# Patient Record
Sex: FEMALE | Race: WHITE | NOT HISPANIC OR LATINO | Employment: OTHER | URBAN - METROPOLITAN AREA
[De-identification: names, ages, dates, MRNs, and addresses within clinical notes are randomized per-mention and may not be internally consistent; named-entity substitution may affect disease eponyms.]

---

## 2018-01-01 ENCOUNTER — HOSPITAL ENCOUNTER (INPATIENT)
Facility: HOSPITAL | Age: 78
LOS: 4 days | Discharge: HOSPICE | End: 2018-03-19
Attending: INTERNAL MEDICINE | Admitting: INTERNAL MEDICINE

## 2018-01-01 ENCOUNTER — HOSPITAL ENCOUNTER (INPATIENT)
Facility: HOSPITAL | Age: 78
LOS: 1 days | End: 2018-03-20
Attending: INTERNAL MEDICINE | Admitting: INTERNAL MEDICINE

## 2018-01-01 ENCOUNTER — APPOINTMENT (OUTPATIENT)
Dept: CT IMAGING | Facility: HOSPITAL | Age: 78
End: 2018-01-01

## 2018-01-01 VITALS — OXYGEN SATURATION: 79 % | SYSTOLIC BLOOD PRESSURE: 112 MMHG | TEMPERATURE: 98.4 F | DIASTOLIC BLOOD PRESSURE: 69 MMHG

## 2018-01-01 VITALS
HEART RATE: 157 BPM | OXYGEN SATURATION: 66 % | SYSTOLIC BLOOD PRESSURE: 128 MMHG | DIASTOLIC BLOOD PRESSURE: 81 MMHG | WEIGHT: 128.53 LBS | BODY MASS INDEX: 20.66 KG/M2 | RESPIRATION RATE: 14 BRPM | HEIGHT: 66 IN | TEMPERATURE: 103.4 F

## 2018-01-01 LAB
ABO GROUP BLD: NORMAL
ANION GAP SERPL CALCULATED.3IONS-SCNC: 17.9 MMOL/L
ARTERIAL PATENCY WRIST A: POSITIVE
ATMOSPHERIC PRESS: 745 MMHG
BASE EXCESS BLDA CALC-SCNC: 0.4 MMOL/L (ref 0–2)
BDY SITE: ABNORMAL
BLD GP AB SCN SERPL QL: NEGATIVE
BUN BLD-MCNC: 15 MG/DL (ref 8–23)
BUN/CREAT SERPL: 15 (ref 7–25)
CALCIUM SPEC-SCNC: 10.1 MG/DL (ref 8.6–10.5)
CHLORIDE SERPL-SCNC: 101 MMOL/L (ref 98–107)
CO2 SERPL-SCNC: 23.1 MMOL/L (ref 22–29)
CREAT BLD-MCNC: 1 MG/DL (ref 0.57–1)
DEPRECATED RDW RBC AUTO: 45.3 FL (ref 37–54)
ERYTHROCYTE [DISTWIDTH] IN BLOOD BY AUTOMATED COUNT: 14.1 % (ref 11.7–13)
GFR SERPL CREATININE-BSD FRML MDRD: 54 ML/MIN/1.73
GLUCOSE BLD-MCNC: 152 MG/DL (ref 65–99)
GLUCOSE BLDC GLUCOMTR-MCNC: 142 MG/DL (ref 70–130)
HCO3 BLDA-SCNC: 24.6 MMOL/L (ref 22–28)
HCT VFR BLD AUTO: 49.2 % (ref 35.6–45.5)
HGB BLD-MCNC: 16.6 G/DL (ref 11.9–15.5)
HOROWITZ INDEX BLD+IHG-RTO: 100 %
INR PPP: 2.95 (ref 0.9–1.1)
MCH RBC QN AUTO: 30.4 PG (ref 26.9–32)
MCHC RBC AUTO-ENTMCNC: 33.7 G/DL (ref 32.4–36.3)
MCV RBC AUTO: 90.1 FL (ref 80.5–98.2)
MODALITY: ABNORMAL
O2 A-A PPRESDIFF RESPIRATORY: 0.9 MMHG
PCO2 BLDA: 37.7 MM HG (ref 35–45)
PEEP RESPIRATORY: 5 CM[H2O]
PH BLDA: 7.42 PH UNITS (ref 7.35–7.45)
PLATELET # BLD AUTO: 177 10*3/MM3 (ref 140–500)
PMV BLD AUTO: 10.4 FL (ref 6–12)
PO2 BLDA: 595.7 MM HG (ref 80–100)
POTASSIUM BLD-SCNC: 3.5 MMOL/L (ref 3.5–5.2)
PROTHROMBIN TIME: 30.2 SECONDS (ref 11.7–14.2)
RBC # BLD AUTO: 5.46 10*6/MM3 (ref 3.9–5.2)
RH BLD: POSITIVE
SAO2 % BLDCOA: 100 % (ref 92–99)
SET MECH RESP RATE: 20
SODIUM BLD-SCNC: 142 MMOL/L (ref 136–145)
TOTAL RATE: 20 BREATHS/MINUTE
TROPONIN T SERPL-MCNC: <0.01 NG/ML (ref 0–0.03)
VENTILATOR MODE: ABNORMAL
VT ON VENT VENT: 400 ML
WBC NRBC COR # BLD: 9.99 10*3/MM3 (ref 4.5–10.7)

## 2018-01-01 PROCEDURE — 84484 ASSAY OF TROPONIN QUANT: CPT | Performed by: INTERNAL MEDICINE

## 2018-01-01 PROCEDURE — 85610 PROTHROMBIN TIME: CPT | Performed by: PSYCHIATRY & NEUROLOGY

## 2018-01-01 PROCEDURE — 25010000002 MORPHINE PER 10 MG: Performed by: INTERNAL MEDICINE

## 2018-01-01 PROCEDURE — 25010000002 MORPHINE SULFATE (PF) 2 MG/ML SOLUTION: Performed by: INTERNAL MEDICINE

## 2018-01-01 PROCEDURE — 94799 UNLISTED PULMONARY SVC/PX: CPT

## 2018-01-01 PROCEDURE — 25010000002 PROTHROMBIN COMPLEX CONC HUMAN 1000 UNITS KIT: Performed by: PSYCHIATRY & NEUROLOGY

## 2018-01-01 PROCEDURE — 25010000002 LORAZEPAM PER 2 MG: Performed by: INTERNAL MEDICINE

## 2018-01-01 PROCEDURE — 25010000002 VITAMIN K1 PER 1 MG: Performed by: PSYCHIATRY & NEUROLOGY

## 2018-01-01 PROCEDURE — 82803 BLOOD GASES ANY COMBINATION: CPT

## 2018-01-01 PROCEDURE — 5A1945Z RESPIRATORY VENTILATION, 24-96 CONSECUTIVE HOURS: ICD-10-PCS | Performed by: INTERNAL MEDICINE

## 2018-01-01 PROCEDURE — 85027 COMPLETE CBC AUTOMATED: CPT | Performed by: INTERNAL MEDICINE

## 2018-01-01 PROCEDURE — 80048 BASIC METABOLIC PNL TOTAL CA: CPT | Performed by: INTERNAL MEDICINE

## 2018-01-01 PROCEDURE — 86900 BLOOD TYPING SEROLOGIC ABO: CPT | Performed by: INTERNAL MEDICINE

## 2018-01-01 PROCEDURE — 82962 GLUCOSE BLOOD TEST: CPT

## 2018-01-01 PROCEDURE — C9132 KCENTRA, PER I.U.: HCPCS | Performed by: PSYCHIATRY & NEUROLOGY

## 2018-01-01 PROCEDURE — 70450 CT HEAD/BRAIN W/O DYE: CPT

## 2018-01-01 PROCEDURE — 94002 VENT MGMT INPAT INIT DAY: CPT

## 2018-01-01 PROCEDURE — 86901 BLOOD TYPING SEROLOGIC RH(D): CPT | Performed by: INTERNAL MEDICINE

## 2018-01-01 PROCEDURE — 36600 WITHDRAWAL OF ARTERIAL BLOOD: CPT

## 2018-01-01 PROCEDURE — 86850 RBC ANTIBODY SCREEN: CPT | Performed by: INTERNAL MEDICINE

## 2018-01-01 PROCEDURE — 99222 1ST HOSP IP/OBS MODERATE 55: CPT | Performed by: PSYCHIATRY & NEUROLOGY

## 2018-01-01 RX ORDER — LORAZEPAM 2 MG/ML
1 CONCENTRATE ORAL
Status: DISCONTINUED | OUTPATIENT
Start: 2018-01-01 | End: 2018-01-01 | Stop reason: HOSPADM

## 2018-01-01 RX ORDER — GLYCOPYRROLATE 0.2 MG/ML
0.4 INJECTION INTRAMUSCULAR; INTRAVENOUS
Status: CANCELLED | OUTPATIENT
Start: 2018-01-01

## 2018-01-01 RX ORDER — GLYCOPYRROLATE 0.2 MG/ML
0.2 INJECTION INTRAMUSCULAR; INTRAVENOUS
Status: CANCELLED | OUTPATIENT
Start: 2018-01-01

## 2018-01-01 RX ORDER — LORAZEPAM 2 MG/ML
2 CONCENTRATE ORAL
Status: DISCONTINUED | OUTPATIENT
Start: 2018-01-01 | End: 2018-01-01 | Stop reason: HOSPADM

## 2018-01-01 RX ORDER — MORPHINE SULFATE 10 MG/ML
6 INJECTION INTRAMUSCULAR; INTRAVENOUS; SUBCUTANEOUS
Status: DISCONTINUED | OUTPATIENT
Start: 2018-01-01 | End: 2018-01-01 | Stop reason: HOSPADM

## 2018-01-01 RX ORDER — LORAZEPAM 2 MG/ML
2 INJECTION INTRAMUSCULAR
Status: DISCONTINUED | OUTPATIENT
Start: 2018-01-01 | End: 2018-01-01 | Stop reason: HOSPADM

## 2018-01-01 RX ORDER — GLYCOPYRROLATE 0.2 MG/ML
0.4 INJECTION INTRAMUSCULAR; INTRAVENOUS
Status: DISCONTINUED | OUTPATIENT
Start: 2018-01-01 | End: 2018-01-01 | Stop reason: HOSPADM

## 2018-01-01 RX ORDER — GLYCOPYRROLATE 0.2 MG/ML
0.2 INJECTION INTRAMUSCULAR; INTRAVENOUS
Status: DISCONTINUED | OUTPATIENT
Start: 2018-01-01 | End: 2018-01-01 | Stop reason: HOSPADM

## 2018-01-01 RX ORDER — SODIUM CHLORIDE 0.9 % (FLUSH) 0.9 %
1-10 SYRINGE (ML) INJECTION AS NEEDED
Status: DISCONTINUED | OUTPATIENT
Start: 2018-01-01 | End: 2018-01-01 | Stop reason: HOSPADM

## 2018-01-01 RX ORDER — MORPHINE SULFATE 20 MG/ML
20 SOLUTION ORAL
Status: CANCELLED | OUTPATIENT
Start: 2018-01-01 | End: 2018-03-25

## 2018-01-01 RX ORDER — LORAZEPAM 2 MG/ML
1 INJECTION INTRAMUSCULAR
Status: CANCELLED | OUTPATIENT
Start: 2018-01-01 | End: 2018-03-25

## 2018-01-01 RX ORDER — MORPHINE SULFATE 10 MG/ML
6 INJECTION INTRAMUSCULAR; INTRAVENOUS; SUBCUTANEOUS
Status: CANCELLED | OUTPATIENT
Start: 2018-01-01 | End: 2018-03-25

## 2018-01-01 RX ORDER — ACETAMINOPHEN 325 MG/1
650 TABLET ORAL EVERY 4 HOURS PRN
Status: DISCONTINUED | OUTPATIENT
Start: 2018-01-01 | End: 2018-01-01 | Stop reason: HOSPADM

## 2018-01-01 RX ORDER — LORAZEPAM 2 MG/ML
2 CONCENTRATE ORAL
Status: CANCELLED | OUTPATIENT
Start: 2018-01-01 | End: 2018-03-25

## 2018-01-01 RX ORDER — DIPHENOXYLATE HYDROCHLORIDE AND ATROPINE SULFATE 2.5; .025 MG/1; MG/1
1 TABLET ORAL
Status: DISCONTINUED | OUTPATIENT
Start: 2018-01-01 | End: 2018-01-01 | Stop reason: HOSPADM

## 2018-01-01 RX ORDER — LORAZEPAM 2 MG/ML
0.5 INJECTION INTRAMUSCULAR
Status: DISCONTINUED | OUTPATIENT
Start: 2018-01-01 | End: 2018-01-01 | Stop reason: HOSPADM

## 2018-01-01 RX ORDER — DIPHENOXYLATE HYDROCHLORIDE AND ATROPINE SULFATE 2.5; .025 MG/1; MG/1
1 TABLET ORAL
Status: CANCELLED | OUTPATIENT
Start: 2018-01-01

## 2018-01-01 RX ORDER — LORAZEPAM 2 MG/ML
0.5 INJECTION INTRAMUSCULAR
Status: CANCELLED | OUTPATIENT
Start: 2018-01-01 | End: 2018-03-25

## 2018-01-01 RX ORDER — ACETAMINOPHEN 160 MG/5ML
650 SOLUTION ORAL EVERY 4 HOURS PRN
Status: CANCELLED | OUTPATIENT
Start: 2018-01-01

## 2018-01-01 RX ORDER — MORPHINE SULFATE 20 MG/ML
20 SOLUTION ORAL
Status: DISCONTINUED | OUTPATIENT
Start: 2018-01-01 | End: 2018-01-01 | Stop reason: HOSPADM

## 2018-01-01 RX ORDER — LORAZEPAM 2 MG/ML
0.5 CONCENTRATE ORAL
Status: DISCONTINUED | OUTPATIENT
Start: 2018-01-01 | End: 2018-01-01 | Stop reason: HOSPADM

## 2018-01-01 RX ORDER — ACETAMINOPHEN 160 MG/5ML
650 SOLUTION ORAL EVERY 4 HOURS PRN
Status: DISCONTINUED | OUTPATIENT
Start: 2018-01-01 | End: 2018-01-01 | Stop reason: HOSPADM

## 2018-01-01 RX ORDER — SCOLOPAMINE TRANSDERMAL SYSTEM 1 MG/1
1 PATCH, EXTENDED RELEASE TRANSDERMAL
Status: DISCONTINUED | OUTPATIENT
Start: 2018-01-01 | End: 2018-01-01 | Stop reason: HOSPADM

## 2018-01-01 RX ORDER — SCOLOPAMINE TRANSDERMAL SYSTEM 1 MG/1
1 PATCH, EXTENDED RELEASE TRANSDERMAL
Status: CANCELLED | OUTPATIENT
Start: 2018-01-01

## 2018-01-01 RX ORDER — ACETAMINOPHEN 650 MG/1
650 SUPPOSITORY RECTAL EVERY 4 HOURS PRN
Status: DISCONTINUED | OUTPATIENT
Start: 2018-01-01 | End: 2018-01-01 | Stop reason: HOSPADM

## 2018-01-01 RX ORDER — LORAZEPAM 2 MG/ML
1 INJECTION INTRAMUSCULAR
Status: DISCONTINUED | OUTPATIENT
Start: 2018-01-01 | End: 2018-01-01 | Stop reason: HOSPADM

## 2018-01-01 RX ORDER — LORAZEPAM 2 MG/ML
1 CONCENTRATE ORAL
Status: CANCELLED | OUTPATIENT
Start: 2018-01-01 | End: 2018-03-25

## 2018-01-01 RX ORDER — ACETAMINOPHEN 325 MG/1
650 TABLET ORAL EVERY 4 HOURS PRN
Status: CANCELLED | OUTPATIENT
Start: 2018-01-01

## 2018-01-01 RX ORDER — LORAZEPAM 2 MG/ML
2 INJECTION INTRAMUSCULAR
Status: CANCELLED | OUTPATIENT
Start: 2018-01-01 | End: 2018-03-25

## 2018-01-01 RX ORDER — SODIUM CHLORIDE 0.9 % (FLUSH) 0.9 %
1-10 SYRINGE (ML) INJECTION AS NEEDED
Status: CANCELLED | OUTPATIENT
Start: 2018-01-01

## 2018-01-01 RX ORDER — ACETAMINOPHEN 650 MG/1
650 SUPPOSITORY RECTAL EVERY 4 HOURS PRN
Status: CANCELLED | OUTPATIENT
Start: 2018-01-01

## 2018-01-01 RX ORDER — LORAZEPAM 2 MG/ML
0.5 CONCENTRATE ORAL
Status: CANCELLED | OUTPATIENT
Start: 2018-01-01 | End: 2018-03-25

## 2018-01-01 RX ADMIN — LORAZEPAM 1 MG: 2 INJECTION INTRAMUSCULAR; INTRAVENOUS at 04:53

## 2018-01-01 RX ADMIN — MORPHINE SULFATE 6 MG: 10 INJECTION INTRAVENOUS at 13:15

## 2018-01-01 RX ADMIN — LORAZEPAM 1 MG: 2 INJECTION INTRAMUSCULAR; INTRAVENOUS at 17:20

## 2018-01-01 RX ADMIN — MORPHINE SULFATE 6 MG: 10 INJECTION INTRAVENOUS at 12:25

## 2018-01-01 RX ADMIN — LORAZEPAM 1 MG: 2 INJECTION INTRAMUSCULAR; INTRAVENOUS at 17:26

## 2018-01-01 RX ADMIN — MORPHINE SULFATE 6 MG: 10 INJECTION INTRAVENOUS at 15:14

## 2018-01-01 RX ADMIN — LORAZEPAM 1 MG: 2 INJECTION INTRAMUSCULAR; INTRAVENOUS at 16:47

## 2018-01-01 RX ADMIN — LORAZEPAM 1 MG: 2 INJECTION INTRAMUSCULAR; INTRAVENOUS at 12:58

## 2018-01-01 RX ADMIN — LORAZEPAM 1 MG: 2 INJECTION INTRAMUSCULAR; INTRAVENOUS at 04:39

## 2018-01-01 RX ADMIN — LORAZEPAM 1 MG: 2 INJECTION INTRAMUSCULAR; INTRAVENOUS at 20:42

## 2018-01-01 RX ADMIN — LORAZEPAM 1 MG: 2 INJECTION INTRAMUSCULAR; INTRAVENOUS at 08:44

## 2018-01-01 RX ADMIN — LORAZEPAM 1 MG: 2 INJECTION INTRAMUSCULAR; INTRAVENOUS at 13:16

## 2018-01-01 RX ADMIN — MORPHINE SULFATE 6 MG: 10 INJECTION INTRAVENOUS at 13:28

## 2018-01-01 RX ADMIN — MORPHINE SULFATE 6 MG: 10 INJECTION INTRAVENOUS at 08:44

## 2018-01-01 RX ADMIN — LORAZEPAM 1 MG: 2 INJECTION INTRAMUSCULAR; INTRAVENOUS at 00:31

## 2018-01-01 RX ADMIN — NICARDIPINE HYDROCHLORIDE 5 MG/HR: 2.5 INJECTION INTRAVENOUS at 08:25

## 2018-01-01 RX ADMIN — LORAZEPAM 1 MG: 2 INJECTION INTRAMUSCULAR; INTRAVENOUS at 05:48

## 2018-01-01 RX ADMIN — MORPHINE SULFATE 6 MG: 10 INJECTION INTRAVENOUS at 00:31

## 2018-01-01 RX ADMIN — LORAZEPAM 2 MG: 2 INJECTION INTRAMUSCULAR; INTRAVENOUS at 16:44

## 2018-01-01 RX ADMIN — MORPHINE SULFATE 6 MG: 10 INJECTION INTRAVENOUS at 04:33

## 2018-01-01 RX ADMIN — MORPHINE SULFATE 6 MG: 10 INJECTION INTRAVENOUS at 17:25

## 2018-01-01 RX ADMIN — MORPHINE SULFATE 6 MG: 10 INJECTION INTRAVENOUS at 08:39

## 2018-01-01 RX ADMIN — MORPHINE SULFATE 6 MG: 10 INJECTION INTRAVENOUS at 05:49

## 2018-01-01 RX ADMIN — LORAZEPAM 1 MG: 2 INJECTION INTRAMUSCULAR; INTRAVENOUS at 04:33

## 2018-01-01 RX ADMIN — LORAZEPAM 1 MG: 2 INJECTION INTRAMUSCULAR; INTRAVENOUS at 15:14

## 2018-01-01 RX ADMIN — LORAZEPAM 1 MG: 2 INJECTION INTRAMUSCULAR; INTRAVENOUS at 08:43

## 2018-01-01 RX ADMIN — MORPHINE SULFATE 6 MG: 10 INJECTION INTRAVENOUS at 00:30

## 2018-01-01 RX ADMIN — MORPHINE SULFATE 6 MG: 10 INJECTION INTRAVENOUS at 17:20

## 2018-01-01 RX ADMIN — LORAZEPAM 1 MG: 2 INJECTION INTRAMUSCULAR; INTRAVENOUS at 20:45

## 2018-01-01 RX ADMIN — LORAZEPAM 2 MG: 2 INJECTION INTRAMUSCULAR; INTRAVENOUS at 12:25

## 2018-01-01 RX ADMIN — MORPHINE SULFATE 6 MG: 10 INJECTION INTRAVENOUS at 16:48

## 2018-01-01 RX ADMIN — LORAZEPAM 1 MG: 2 INJECTION INTRAMUSCULAR; INTRAVENOUS at 13:28

## 2018-01-01 RX ADMIN — LORAZEPAM 1 MG: 2 INJECTION INTRAMUSCULAR; INTRAVENOUS at 04:32

## 2018-01-01 RX ADMIN — MORPHINE SULFATE 6 MG: 10 INJECTION INTRAVENOUS at 20:44

## 2018-01-01 RX ADMIN — LORAZEPAM 1 MG: 2 INJECTION INTRAMUSCULAR; INTRAVENOUS at 21:03

## 2018-01-01 RX ADMIN — MORPHINE SULFATE 6 MG: 10 INJECTION INTRAVENOUS at 16:44

## 2018-01-01 RX ADMIN — MORPHINE SULFATE 6 MG: 10 INJECTION INTRAVENOUS at 00:23

## 2018-01-01 RX ADMIN — LORAZEPAM 2 MG: 2 INJECTION INTRAMUSCULAR; INTRAVENOUS at 08:54

## 2018-01-01 RX ADMIN — GLYCOPYRROLATE 0.4 MG: 0.2 INJECTION, SOLUTION INTRAMUSCULAR; INTRAVENOUS at 12:58

## 2018-01-01 RX ADMIN — MORPHINE SULFATE 6 MG: 10 INJECTION INTRAVENOUS at 04:53

## 2018-01-01 RX ADMIN — MORPHINE SULFATE 6 MG: 10 INJECTION INTRAVENOUS at 20:48

## 2018-01-01 RX ADMIN — MORPHINE SULFATE 6 MG: 10 INJECTION INTRAVENOUS at 13:39

## 2018-01-01 RX ADMIN — LORAZEPAM 1 MG: 2 INJECTION INTRAMUSCULAR; INTRAVENOUS at 13:39

## 2018-01-01 RX ADMIN — LORAZEPAM 1 MG: 2 INJECTION INTRAMUSCULAR; INTRAVENOUS at 08:40

## 2018-01-01 RX ADMIN — LORAZEPAM 1 MG: 2 INJECTION INTRAMUSCULAR; INTRAVENOUS at 00:30

## 2018-01-01 RX ADMIN — LORAZEPAM 1 MG: 2 INJECTION INTRAMUSCULAR; INTRAVENOUS at 00:23

## 2018-01-01 RX ADMIN — MORPHINE SULFATE 6 MG: 10 INJECTION INTRAVENOUS at 04:39

## 2018-01-01 RX ADMIN — MORPHINE SULFATE 6 MG: 10 INJECTION INTRAVENOUS at 08:53

## 2018-01-01 RX ADMIN — MORPHINE SULFATE 6 MG: 10 INJECTION INTRAVENOUS at 04:32

## 2018-01-01 RX ADMIN — PHYTONADIONE 10 MG: 10 INJECTION, EMULSION INTRAMUSCULAR; INTRAVENOUS; SUBCUTANEOUS at 09:07

## 2018-01-01 RX ADMIN — MORPHINE SULFATE 6 MG: 10 INJECTION INTRAVENOUS at 08:43

## 2018-01-01 RX ADMIN — MORPHINE SULFATE 6 MG: 10 INJECTION INTRAVENOUS at 21:03

## 2018-01-01 RX ADMIN — LORAZEPAM 1 MG: 2 INJECTION INTRAMUSCULAR; INTRAVENOUS at 20:48

## 2018-01-01 RX ADMIN — MORPHINE SULFATE 6 MG: 10 INJECTION INTRAVENOUS at 20:41

## 2018-01-01 RX ADMIN — Medication 5 MG/HR: at 08:25

## 2018-01-01 RX ADMIN — PROTHROMBIN, COAGULATION FACTOR VII HUMAN, COAGULATION FACTOR IX HUMAN, COAGULATION FACTOR X HUMAN, PROTEIN C, PROTEIN S HUMAN, AND WATER 2079 UNITS: KIT at 08:49

## 2018-03-15 PROBLEM — I61.9 INTRACEREBRAL BLEED (HCC): Status: ACTIVE | Noted: 2018-01-01

## 2018-03-15 NOTE — NURSING NOTE
Kika Galvez ([facility]) called back. Pt is ruled out for donation. Okay to extubate when family is ready. Call back with cardiac time of death.

## 2018-03-15 NOTE — NURSING NOTE
Kika Galvez ([facility]) called back stating that pt is ruled out for donation. Staff is okay to extubate whenever family is ready. Call back with cardiac time of death.

## 2018-03-15 NOTE — SIGNIFICANT NOTE
03/15/18 0852   Rehab Time/Intention   Evaluation Not Performed unable to evaluate, medical status change;other (see comments)  (Discussed with RN. Pt currently intubated. ST to s/o at this time. Please re-consult as appropriate. )   Rehab Treatment   Discipline speech language pathologist

## 2018-03-15 NOTE — NURSING NOTE
Spoke with Kkia Galvez with [employer]. Gave background information on patient. She stated that she would review the case and contact staff shortly.

## 2018-03-15 NOTE — PLAN OF CARE
Problem: Patient Care Overview  Goal: Plan of Care Review   03/15/18 1906   Coping/Psychosocial   Plan of Care Reviewed With family   Plan of Care Review   Progress declining   OTHER   Outcome Summary Pt admitted from [facility] with ICH. After speaking with MDs, family opted to persue comfort care. Pt was ruled out by [facility] and extubated around noon. Medicated with ativan and morphine as needed. F/c placed for urinary retention. Transferred to [facility] at end of shift.        Problem: Skin Injury Risk (Adult)  Goal: Skin Health and Integrity   03/15/18 1906   Skin Injury Risk (Adult)   Skin Health and Integrity making progress toward outcome       Problem: Fall Risk (Adult)  Goal: Identify Related Risk Factors and Signs and Symptoms   03/15/18 1906   Fall Risk (Adult)   Signs and Symptoms (Fall Risk) presence of risk factors     Goal: Absence of Fall   03/15/18 1906   Fall Risk (Adult)   Absence of Fall making progress toward outcome       Problem: Palliative Care (Adult)  Goal: Maximized Comfort   03/15/18 1906   Palliative Care (Adult)   Maximized Comfort making progress toward outcome     Goal: Enhanced Quality of Life   03/15/18 1906   Palliative Care (Adult)   Enhanced Quality of Life making progress toward outcome

## 2018-03-15 NOTE — SIGNIFICANT NOTE
03/15/18 1158   Rehab Time/Intention   Evaluation Not Performed other (see comments)  (pt is intubated and sedated, there is discussion of withdrawal of care, PT not appropriate at this time, will sign off, please reconsult if needed )   Rehab Treatment   Discipline physical therapist

## 2018-03-15 NOTE — H&P
[facility]    Patient Care Team:  Venancio Rios MD as PCP - General (Family Medicine)    CC: Confusion    HPI: 77 y.o. 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 She was originally seen in [facility] and on arrival she had low gcs. Ct head demonstrated left basal ganglia bleed and [facility] called [facility] to transfer her care here.   Patient was accepted after discussing with the neurology team.  She was intubated prior to transfer for airway protection.    During my evaluation ICU patient is minimally responsive.  She does have a cough reflex and breathes over the vent other than that she does not respond to even noxious stimuli.  Repeat CT head was done in [facility] which showed large basal gangliar bleed.    Long discussion with the patient's family members with the help of neurology team and the final plan is to withdraw care.  Neurosurgery has evaluated the patient and feels that there is no significant chance of recovery and have not offered surgical intervention    Records Reviewed  Old medical records.  Nursing notes.  Previous radiology studies.    Review of Systems:  Cannot obtain due to critical illness    Family History   Problem Relation Age of Onset   • Other Mother      HEART PROBLEM   • Diabetes Father    • Other Father      HEART PROBLEM   • Prostate cancer Brother        Social History     Social History   • Marital status:      Occupational History   • Homemaker       Social History Main Topics   • Smoking status: Never Smoker   • Alcohol use No   • Drug use: No     Other Topics Concern   • Not on file       Past Medical History:   Diagnosis Date   • CVA (cerebral infarction)    • Diverticular disease    • DJD (degenerative  "joint disease)    • Hypertension        Past Surgical History:   Procedure Laterality Date   • BREAST SURGERY Right    • BUNIONECTOMY Right 2015   • CATARACT EXTRACTION Bilateral 2012   • COLON SURGERY  2006   • EYE SURGERY      LASER   • FOOT SURGERY      R MIDDLE TOE \"CYST\" 3/9/2006   • HERNIA REPAIR     • TOTAL KNEE ARTHROPLASTY Right 07/2015       Prior to Admission Medications     Prescriptions Last Dose Informant Patient Reported? Taking?    amLODIPine (NORVASC) 5 MG tablet   Yes No    Take by mouth.    aspirin 81 MG tablet   Yes No    Take 81 mg by mouth daily.    baclofen (LIORESAL) 10 MG tablet   Yes No    CARTIA  MG 24 hr capsule   Yes No    celecoxib (CeleBREX) 200 MG capsule   Yes No    DENTA 5000 PLUS 1.1 % cream   Yes No    diazepam (VALIUM) 2 MG tablet   Yes No    diazepam (VALIUM) 5 MG tablet   Yes No    Take by mouth. TAKE 1-2 TABS 30 MIN BEFORE MRI    diazepam (VALIUM) 5 MG tablet   Yes No    Take 5 mg by mouth every 8 (eight) hours as needed for anxiety.    dicyclomine (BENTYL) 10 MG capsule   Yes No    gabapentin (NEURONTIN) 300 MG capsule   Yes No    HYDROcodone-acetaminophen (NORCO) 7.5-325 MG per tablet   Yes No    hyoscyamine (LEVSIN) 0.125 MG SL tablet   Yes No    lisinopril (PRINIVIL,ZESTRIL) 20 MG tablet   Yes No    lisinopril (PRINIVIL,ZESTRIL) 40 MG tablet   Yes No    metoprolol tartrate (LOPRESSOR) 100 MG tablet   Yes No    Take by mouth.    metoprolol tartrate (LOPRESSOR) 50 MG tablet   Yes No    Take 50 mg by mouth 2 (two) times a day.    naproxen (NAPROSYN) 500 MG tablet   Yes No    Take 500 mg by mouth 2 (two) times a day with meals.    NITROSTAT 0.4 MG SL tablet   Yes No    omeprazole (PriLOSEC) 40 MG capsule   Yes No    ondansetron (ZOFRAN) 4 MG tablet   Yes No    ondansetron ODT (ZOFRAN-ODT) 4 MG disintegrating tablet   Yes No    Take 4 mg by mouth every 8 (eight) hours as needed for nausea or vomiting.    pantoprazole (PROTONIX) 40 MG EC tablet   Yes No    Take 40 mg by " mouth daily.    polyethylene glycol (MIRALAX) powder   Yes No    traMADol (ULTRAM) 50 MG tablet   Yes No    Take by mouth.    warfarin (COUMADIN) 3 MG tablet   Yes No          Aliskiren; Amoxicillin; Atenolol; Bisoprolol; Candesartan; Cardizem  [diltiazem]; Clonidine; Ezetimibe; Fenofibrate; Gabapentin; Gemfibrozil; Hydrochlorothiazide; Lisinopril; Lovaza  [omega-3-acid ethyl esters]; Meloxicam; Naproxen; Nebivolol hcl; Niacin; Norvasc  [amlodipine]; Oxytetracycline; Pramipexole; Pravastatin; Telmisartan; Keflex  [cephalexin]; and Sulfa antibiotics    Heart Rate:  [] 151  Resp:  [20] 20  BP: (101-194)/() 139/80  FiO2 (%):  [40 %-100 %] 100 %    Physical Exam    Constitutional: Elderly white female minimally responsive on the mechanical ventilator   Head: - NCAT  Eyes: No pallor, Anicteric conjunctiva,   ENMT:  Endotracheal tube in place  No palpable cervical LApathy  Heart: RRR, no murmur  Lungs: LAYNE +, No wheezes/ crackles heard    Abdomen: Soft. No tenderness, guarding or rigidity  Extremities: No cyanosis or clubbing. Pitting edema  Neuro: Intubated and unresponsive.        LABS and IMAGING DATA:    Lab Results (last 24 hours)     Procedure Component Value Units Date/Time    Troponin [[id number]]  (Normal) Collected:  03/15/18 0846    Specimen:  Blood Updated:  03/15/18 0927     Troponin T <0.010 ng/mL     Narrative:       Troponin T Reference Ranges:  Less than 0.03 ng/mL:    Negative for AMI  0.03 to 0.09 ng/mL:      Indeterminant for AMI  Greater than 0.09 ng/mL: Positive for AMI    Basic Metabolic Panel [[id number]]  (Abnormal) Collected:  03/15/18 0846    Specimen:  Blood Updated:  03/15/18 0926     Glucose 152 (H) mg/dL      BUN 15 mg/dL      Creatinine 1.00 mg/dL      Sodium 142 mmol/L      Potassium 3.5 mmol/L      Chloride 101 mmol/L      CO2 23.1 mmol/L      Calcium 10.1 mg/dL      eGFR Non African Amer 54 (L) mL/min/1.73      BUN/Creatinine Ratio 15.0     Anion Gap 17.9 mmol/L     Narrative:        The MDRD GFR formula is only valid for adults with stable renal function between ages 18 and 70.    Protime-INR [[id number]]  (Abnormal) Collected:  03/15/18 0846    Specimen:  Blood Updated:  03/15/18 0913     Protime 30.2 (H) Seconds      INR 2.95 (H)    Blood Gas, Arterial [[id number]]  (Abnormal) Collected:  03/15/18 0901    Specimen:  Arterial Blood Updated:  03/15/18 0904     Site Arterial: left radial     Dustin's Test Positive     pH, Arterial 7.423 pH units      pCO2, Arterial 37.7 mm Hg      pO2, Arterial 595.7 (H) mm Hg      HCO3, Arterial 24.6 mmol/L      Base Excess, Arterial 0.4 mmol/L      O2 Saturation Calculated 100.0 (H) %      A-a Gradiant 0.9 mmHg      Barometric Pressure for Blood Gas 745.0 mmHg      Modality Adult Vent     FIO2 100 %      Ventilator Mode VC     Set Tidal Volume 400     Set Mech Resp Rate 20     Rate 20 Breaths/minute      PEEP 5    Narrative:       SpO2-100% Vt-408 Meter: [id number] : [id number] Bryant Cruz    CBC (No Diff) [[id number]]  (Abnormal) Collected:  03/15/18 0846    Specimen:  Blood Updated:  03/15/18 0902     WBC 9.99 10*3/mm3      RBC 5.46 (H) 10*6/mm3      Hemoglobin 16.6 (H) g/dL      Hematocrit 49.2 (H) %      MCV 90.1 fL      MCH 30.4 pg      MCHC 33.7 g/dL      RDW 14.1 (H) %      RDW-SD 45.3 fl      MPV 10.4 fL      Platelets 177 10*3/mm3     POC Glucose Once [[id number]]  (Abnormal) Collected:  03/15/18 0825    Specimen:  Blood Updated:  03/15/18 0836     Glucose 142 (H) mg/dL     Narrative:       Meter: [id number] : [id number] Odalys Weiss          Lab Results   Component Value Date    CALCIUM 10.1 03/15/2018     Results from last 7 days  Lab Units 03/15/18  0846   SODIUM mmol/L 142   POTASSIUM mmol/L 3.5   CHLORIDE mmol/L 101   CO2 mmol/L 23.1   BUN mg/dL 15   CREATININE mg/dL 1.00   GLUCOSE mg/dL 152*   CALCIUM mg/dL 10.1   WBC 10*3/mm3 9.99   HEMOGLOBIN g/dL 16.6*   PLATELETS 10*3/mm3 177     Lab Results   Component Value Date    TROPONINT  <0.010 03/15/2018       Results from last 7 days  Lab Units 03/15/18  0846   TROPONIN T ng/mL <0.010               Results from last 7 days  Lab Units 03/15/18  0846   INR  2.95*     No results found for: TSH  Estimated Creatinine Clearance: 44.7 mL/min (by C-G formula based on SCr of 1 mg/dL).    Imaging Results (all)     Procedure Component Value Units Date/Time    CT Head Without Contrast [[id number]] Collected:  03/15/18 0904     Updated:  03/15/18 1319    Narrative:       CT HEAD WITHOUT CONTRAST     CLINICAL HISTORY: Intracranial hemorrhage.     TECHNIQUE: CT scan of the head was obtained with 3 mm axial images. No  intravenous contrast was administered.     FINDINGS:     There is acute intraparenchymal hemorrhage within the left lentiform  nucleus, anterior limb of the right internal capsule, and the right  caudate head. This focus of acute intraparenchymal hemorrhage measures  up to approximately 5.3 x 2.9 cm in greatest axial dimensions. This  hemorrhage dissects into the left lateral ventricle and there is a small  amount of intraventricular hemorrhage identified within the dependent  aspect of the left lateral ventricle. There is mass effect with sulcal  as well as ventricular effacement. Midline shift to the right by  approximately 4 mm is seen. There are moderate changes of chronic small  vessel ischemic phenomena. Chronic infarct is identified within the  right caudate head measuring up to 1.4 cm in diameter. Atherosclerotic  changes are appreciated within the intracranial vasculature.       Impression:          There are no prior imaging studies available for comparison. The current  head CT demonstrates a focus of dense and presumably acute  intraparenchymal hemorrhage within the left lentiform nucleus, anterior  limb of the left internal capsule, and left caudate head. This dissects  into the left lateral ventricle and there is a small amount of  intraventricular hemorrhage noted within the dependent  aspect of the  left lateral ventricle. There is no evidence for hydrocephalus. Again,  there is ventricular as well as sulcal effacement. Midline shift to the  right by approximately 4 mm is noted.     Moderate changes of chronic small vessel ischemic phenomena. 1.4 cm  chronic infarct within the right caudate head.     These findings were discussed with Dr. Quintero on 03/15/2018 at  approximately 9:20 AM.     Radiation dose reduction techniques were utilized, including automated  exposure control and exposure modulation based on body size.     This report was finalized on 3/15/2018 1:16 PM by Dr. Jigar Gonzalez MD.             ASSESSMENT:  Large left hemispheric bleed with brain edema  Uncontrolled hypertension  Atrial fibrillation on anticoagulation  Supratherapeutic INR  Acute hypercapnic respiratory failure    PLAN:  Patient was evaluated with a repeat CT head after admission to the hospital.  Her coagulopathy was corrected with prothrombin, clicks concentrate and platelets.  Neurology and neurosurgery have value dated the patient and confirmed an extremity poor prognosis.  Given the poor long-term outcomes and quality of life the family has decided to pursue palliative withdrawal of care.  Awaiting for the family members to see the patient.  Next and I have placed palliative orders to be used as needed.  Patient will be extubated when the family is ready.   services have been offered.  RN aware of the plan  Discussed in detail with the neurology and neurosurgery teams.    CC- 39 MINS    I have discussed my plan with the patient, available family and nursing staff.     Gilmer Bustos MD  3/15/2018  4:00 PM

## 2018-03-15 NOTE — SIGNIFICANT NOTE
03/15/18 0958   Rehab Time/Intention   Evaluation Not Performed other (see comments)  (Spoke with RN Raul. Pt vented and sedated and discussin to withdrawl care. Will complete order at this time.Please reconsult if appropriate.)   Rehab Treatment   Discipline occupational therapist

## 2018-03-15 NOTE — CONSULTS
"Encounter Date: 3/15/2018    CHIEF COMPLAINT: Intracerebral hemorrhage transfer from outside hospital for management og ICH    There is no problem list on file for this patient.      PRESENT ILLNESS:    Portions of this notes is copied from previous physician encounters, reviewed and edited appropriately.    Background:     Nancy Monteiro is a 77 y.o. female with previous h/o ISCHEMIC STROKE AFFECTING right side of her body, afib on couamdin now trasnferred from [facility] after she was found to have worsening of her right side weakness from baseline and altered sensorium. She was originally seen in [facility] and on arrival she had low gcs. Ct head demonstartd left basal ganglia bleed and [facility] called [facility] to trasnfer her care here.    She was intubated prior to  Her departure from outside hospital for airway protection.   On arival pt here is intubated and unresponsive.  Family present by the bedside.  They confirmed above history    Review of systems   Cant review systems because of AMS.      Past Medical History:   Diagnosis Date   • CVA (cerebral infarction)    • Diverticular disease    • DJD (degenerative joint disease)    • Hypertension        Past Surgical History:   Procedure Laterality Date   • BREAST SURGERY Right    • BUNIONECTOMY Right 2015   • CATARACT EXTRACTION Bilateral 2012   • COLON SURGERY  2006   • EYE SURGERY      LASER   • FOOT SURGERY      R MIDDLE TOE \"CYST\" 3/9/2006   • HERNIA REPAIR     • TOTAL KNEE ARTHROPLASTY Right 07/2015       Current Facility-Administered Medications   Medication Dose Route Frequency Provider Last Rate Last Dose   • niCARdipine (CARDENE) infusion VTB (mbp)  - ADS Override Pull                Allergies   Allergen Reactions   • Aliskiren    • Amoxicillin Diarrhea   • Atenolol    • Bisoprolol    • Candesartan    • Cardizem  [Diltiazem]    • Clonidine    • Ezetimibe Nausea Only   • Fenofibrate    • Gabapentin Other (See Comments)   • " Gemfibrozil    • Hydrochlorothiazide    • Lisinopril Cough   • Lovaza  [Omega-3-Acid Ethyl Esters] Nausea Only   • Meloxicam Nausea Only   • Naproxen    • Nebivolol Hcl    • Niacin    • Norvasc  [Amlodipine]    • Oxytetracycline Nausea Only   • Pramipexole    • Pravastatin    • Telmisartan    • Keflex  [Cephalexin] Rash   • Sulfa Antibiotics Palpitations       Social History     Social History   • Marital status:      Spouse name: N/A   • Number of children: N/A   • Years of education: N/A     Occupational History   • Homemaker       Social History Main Topics   • Smoking status: Never Smoker   • Smokeless tobacco: Not on file   • Alcohol use No   • Drug use: No   • Sexual activity: Not on file     Other Topics Concern   • Not on file     Social History Narrative   • No narrative on file       Family History   Problem Relation Age of Onset   • Other Mother      HEART PROBLEM   • Diabetes Father    • Other Father      HEART PROBLEM   • Prostate cancer Brother        Family Status   Relation Status   • Mother    • Father    • Brother        No current facility-administered medications on file prior to encounter.      Current Outpatient Prescriptions on File Prior to Encounter   Medication Sig   • amLODIPine (NORVASC) 5 MG tablet Take by mouth.   • aspirin 81 MG tablet Take 81 mg by mouth daily.   • baclofen (LIORESAL) 10 MG tablet    • CARTIA  MG 24 hr capsule    • celecoxib (CeleBREX) 200 MG capsule    • DENTA 5000 PLUS 1.1 % cream    • diazepam (VALIUM) 2 MG tablet    • diazepam (VALIUM) 5 MG tablet Take by mouth. TAKE 1-2 TABS 30 MIN BEFORE MRI   • diazepam (VALIUM) 5 MG tablet Take 5 mg by mouth every 8 (eight) hours as needed for anxiety.   • dicyclomine (BENTYL) 10 MG capsule    • gabapentin (NEURONTIN) 300 MG capsule    • HYDROcodone-acetaminophen (NORCO) 7.5-325 MG per tablet    • hyoscyamine (LEVSIN) 0.125 MG SL tablet    • lisinopril (PRINIVIL,ZESTRIL) 20 MG tablet    • lisinopril  (PRINIVIL,ZESTRIL) 40 MG tablet    • metoprolol tartrate (LOPRESSOR) 100 MG tablet Take by mouth.   • metoprolol tartrate (LOPRESSOR) 50 MG tablet Take 50 mg by mouth 2 (two) times a day.   • naproxen (NAPROSYN) 500 MG tablet Take 500 mg by mouth 2 (two) times a day with meals.   • NITROSTAT 0.4 MG SL tablet    • omeprazole (PriLOSEC) 40 MG capsule    • ondansetron (ZOFRAN) 4 MG tablet    • ondansetron ODT (ZOFRAN-ODT) 4 MG disintegrating tablet Take 4 mg by mouth every 8 (eight) hours as needed for nausea or vomiting.   • pantoprazole (PROTONIX) 40 MG EC tablet Take 40 mg by mouth daily.   • polyethylene glycol (MIRALAX) powder    • traMADol (ULTRAM) 50 MG tablet Take by mouth.   • warfarin (COUMADIN) 3 MG tablet            PHYSICAL EXAMINATION:    Vitals: Patient Vitals for the past 4 hrs:   Resp   03/15/18 0823 (P) 20     Resp:  [20] (P) 20  FiO2 (%):  [100 %] 100 %    General:  pleasant in no acute distress.  HEENT: No pallor or icterus. Neck supple. No Carotid bruits.  Heart: S1 and S2 normal with regular rhythm.  No murmur.       GENERAL NEUROLOGIC EXAM     Mental Status: obtunded, intubated and unresponsive. Non-verbal     Cranial nerves:  pupils equal but unresponsive, no gaze deviation, no dolls eye.     Motor: burt sot with draw to pain on either extremities.      Sensation: no response to pain on either extremities.     Coordination and gait cant be tested as pt does not participate.      Labs:     Lab Results   Component Value Date    HGB 11.4 (L) 02/04/2016    HCT 34.6 (L) 02/04/2016    WBC 14.56 (H) 02/04/2016     02/04/2016     Lab Results   Component Value Date    BUN 20 02/04/2016    CALCIUM 10.2 02/04/2016     02/04/2016    K 4.5 02/04/2016     02/04/2016    CO2 26 02/04/2016     Lab Results   Component Value Date    ALT 10 02/02/2016    AST 16 02/02/2016    BILITOT 0.6 02/02/2016     Lab Results   Component Value Date    PROTIME 14.2 02/02/2016    INR 1.1 02/02/2016     No  components found for: POCGLUC  No components found for: A1C  No results found for: HDL, LDL  No components found for: B12  No results found for: TSH    Lab Results (last 24 hours)     ** No results found for the last 24 hours. **          .  Imaging Results (last 24 hours)     Procedure Component Value Units Date/Time    CT Head Without Contrast [[id number]] Updated:  03/15/18 0814          For this problem, the following was ordered:  Orders Placed This Encounter   Procedures   • CT Head Without Contrast       Problem List Items Addressed This Visit     None      Visit Diagnoses    None.         ASSESSMENT/PLAN: This is a 77 y.o. female who has   Past Medical History:   Diagnosis Date   • CVA (cerebral infarction)    • Diverticular disease    • DJD (degenerative joint disease)    • Hypertension     presents with left basal ganglia bleed.    1. Large Left hemispheric Bleed with brain edema:  - Reversal of anticoagulation  - bp target <140/80, will use nicardipine drip  - neurosurgery consult.  - will discuss with family when they arrive about prognosis.    2. Uncontrolled HTN:  - nicaripine drip    3. afib on anticoagulation - rubio[prathrapeutic INR:  - reversal of INR.    Extremely guarded prognosis .    Addendum:    I discussed extensively with all the family members including  and daughter and son.  They are all in  consensus that patient did not want to have any intubation or prolonged life measures and other intervention if there are no chances of good quality of life.  Given extensive left basal ganglia bleed which will lead to significant right sided deficit and possibly of G-tube placement in nursing home placement, family are not wanting to pursue further care and wants palliative hospice and withdraw of care and DNR/DNI.    I agree with the decision.  This was conveyed to the ICU attending.  Once all family members arrive they will pursue with draw of care.    We will sign off please call us back with  questions.      Thank you for allowing us to participate in the care of this patient.    Faiza Quintero MD  03/15/18  8:24 AM

## 2018-03-15 NOTE — CONSULTS
spoke to Nino Ibanez. He stated he will be in to provide sacrament of the sick. Please page the  if there are any other spiritual concerns.

## 2018-03-15 NOTE — NURSING NOTE
Received referral through stroke order set. Based on plan of care, no determined need for rehab. Will sign off. Thanks, Jose Alejandro GAMEZ rehab admission nurse [phone]

## 2018-03-15 NOTE — NURSING NOTE
Spoke with Contreras at [facility]. Informed of pt's GCS and limited history. Awaiting call back from Kika Galvez

## 2018-03-15 NOTE — CONSULTS
" has seen the family twice today. The patient has a very large and supportive family present and bedside. The patient's  asked for a  and both chaplain Miranda and I have reached out to Nino Ibanez with no luck to get a call back. The patient's  expressed the need to get the patient the Sacrament of the Sick. He also said that she had been anointed a few times before. He expressed that it was not \"super important\" that a  came in, but we will still follow up with the request.     I also visited the family and prayed with them earlier today. They expressed their gratitude and need for prayer. For any immediate spiritual care concerns, please page the .   "

## 2018-03-16 NOTE — PLAN OF CARE
Problem: Patient Care Overview  Goal: Plan of Care Review  Outcome: Ongoing (interventions implemented as appropriate)   03/15/18 1906 03/15/18 1945 03/16/18 0602   Coping/Psychosocial   Plan of Care Reviewed With --  spouse;daughter --    Plan of Care Review   Progress declining --  --    OTHER   Outcome Summary --  --  Maintained comfort measures per palliative care protocol. Family at bedside. Spouse and son wants to speak with MD regarding patient status. Patient was medicated PRN with ativan and Morphine. Son is a little hesitant with medicating patient. Will continue to monitor vital signs and comfort.     Goal: Discharge Needs Assessment  Outcome: Ongoing (interventions implemented as appropriate)      Problem: Skin Injury Risk (Adult)  Goal: Identify Related Risk Factors and Signs and Symptoms  Outcome: Ongoing (interventions implemented as appropriate)    Goal: Skin Health and Integrity  Outcome: Ongoing (interventions implemented as appropriate)      Problem: Fall Risk (Adult)  Goal: Identify Related Risk Factors and Signs and Symptoms  Outcome: Ongoing (interventions implemented as appropriate)    Goal: Absence of Fall  Outcome: Ongoing (interventions implemented as appropriate)      Problem: Palliative Care (Adult)  Goal: Maximized Comfort  Outcome: Ongoing (interventions implemented as appropriate)    Goal: Enhanced Quality of Life  Outcome: Ongoing (interventions implemented as appropriate)

## 2018-03-16 NOTE — CONSULTS
Briefly met with daughter at bedside to explain hospice services. Spouse had gone home for the day. Daughter called him to explain our services, and he is interested in the possibility of SB admission. Unfortunately we no longer have any available appointments over the weekend, but we have placed patient on our Liaison's schedule for Monday to follow-up on for possible admission. Spouse and daughter are agreeable to this plan. Thank you for referral. Please call our office if you have questions or if patient expires prior to our meeting on Monday.    Shelbi Coon RN  [facility]  [phone]

## 2018-03-16 NOTE — PROGRESS NOTES
Discharge Planning Assessment  [facility]     Patient Name: Nancy Monteiro  MRN: [id number]  Today's Date: 3/16/2018    Admit Date: 3/15/2018          Discharge Needs Assessment     Row Name 03/16/18 1503       Resource/Environmental Concerns    Transportation Concerns car, none       Transition Planning    Patient/Family Anticipates Transition to inpatient hospice       Discharge Needs Assessment    Equipment Currently Used at Home none            Discharge Plan     Row Name 03/16/18 1515       Plan    Plan Comfort measures     Plan Comments Spoke with family at bedside, face sheet verified. Patient lives with her  Jules [phone]. Patient is comfort measures only. CCP left Bertha Martin's business card with the daughter at bedside for needs. Layne Hudson RN        Destination     No service coordination in this encounter.      Durable Medical Equipment     No service coordination in this encounter.      Dialysis/Infusion     No service coordination in this encounter.      Home Medical Care     No service coordination in this encounter.      Social Care     No service coordination in this encounter.                Demographic Summary     Row Name 03/16/18 1454       General Information    Admission Type inpatient    Arrived From home    Required Notices Provided Important Message from Medicare    Referral Source admission list    Reason for Consult discharge planning    Preferred Language English            Functional Status     Row Name 03/16/18 1500       Functional Status    Current Activity Tolerance other (see comments)   comfort measures             Psychosocial    No documentation.           Abuse/Neglect    No documentation.           Legal    No documentation.           Substance Abuse    No documentation.           Patient Forms    No documentation.         Layne Hudson, RN

## 2018-03-16 NOTE — PROGRESS NOTES
Gilmer Bustos MD                          [phone]      Patient ID:    Name:  Nancy Monteiro    MRN:  [id number]    1940   77 y.o.  female            Patient Care Team:  Venancio Rios MD as PCP - General (Family Medicine)    LOS: 1    CC/Reason for visit:     Subjective: Pt seen and examined this AM. No acute overnight events noted. Doing better.       ROS:   Denies any fevers/ chills/ CP/ palpitations/ Nausea/ vomiting/ Diarrhoea  No Worsening cough or SOA.     Objective     Vital Signs past 24hrs    BP range: BP: (125-145)/(78-80) 125/80  Pulse range: Heart Rate:  [] 143  Resp rate range: Resp:  [16] 16  Temp range: Temp (24hrs), Av.7 °F (37.1 °C), Min:98.1 °F (36.7 °C), Max:99.2 °F (37.3 °C)      Ventilator/Non-Invasive Ventilation Settings     None          Device (Oxygen Therapy): room air FiO2 (%): 100 %     58.3 kg (128 lb 8.5 oz); Body mass index is 20.74 kg/m².      Intake/Output Summary (Last 24 hours) at 18 1607  Last data filed at 18 1401   Gross per 24 hour   Intake                0 ml   Output              725 ml   Net             -725 ml       Exam:    Constitutional: Elderly white female unresponsive   Head: - NCAT  Eyes: No pallor, Anicteric conjunctiva,   ENMT:  No palpable cervical LApathy  Heart: RRR, no murmur  Lungs: LAYNE +, No wheezes/ crackles heard    Abdomen: Soft. No tenderness, guarding or rigidity  Extremities: No cyanosis or clubbing. Pitting edema    Scheduled meds:       IV meds:                           Data Review:        Results from last 7 days  Lab Units 03/15/18  0846   SODIUM mmol/L 142   POTASSIUM mmol/L 3.5   CHLORIDE mmol/L 101   CO2 mmol/L 23.1   BUN mg/dL 15   CREATININE mg/dL 1.00   CALCIUM mg/dL 10.1   GLUCOSE mg/dL 152*   WBC 10*3/mm3 9.99   HEMOGLOBIN g/dL 16.6*   PLATELETS 10*3/mm3 177   INR  2.95*       Lab Results   Component Value Date    CALCIUM 10.1 03/15/2018               Results from  last 7 days  Lab Units 03/15/18  0846   TROPONIN T ng/mL <0.010       Results Review:    I have reviewed the available laboratory results and reviewed the chest imaging personally    Imaging Results (all)     Procedure Component Value Units Date/Time    CT Head Without Contrast [[id number]] Collected:  03/15/18 0904     Updated:  03/15/18 1319    Narrative:       CT HEAD WITHOUT CONTRAST     CLINICAL HISTORY: Intracranial hemorrhage.     TECHNIQUE: CT scan of the head was obtained with 3 mm axial images. No  intravenous contrast was administered.     FINDINGS:     There is acute intraparenchymal hemorrhage within the left lentiform  nucleus, anterior limb of the right internal capsule, and the right  caudate head. This focus of acute intraparenchymal hemorrhage measures  up to approximately 5.3 x 2.9 cm in greatest axial dimensions. This  hemorrhage dissects into the left lateral ventricle and there is a small  amount of intraventricular hemorrhage identified within the dependent  aspect of the left lateral ventricle. There is mass effect with sulcal  as well as ventricular effacement. Midline shift to the right by  approximately 4 mm is seen. There are moderate changes of chronic small  vessel ischemic phenomena. Chronic infarct is identified within the  right caudate head measuring up to 1.4 cm in diameter. Atherosclerotic  changes are appreciated within the intracranial vasculature.       Impression:          There are no prior imaging studies available for comparison. The current  head CT demonstrates a focus of dense and presumably acute  intraparenchymal hemorrhage within the left lentiform nucleus, anterior  limb of the left internal capsule, and left caudate head. This dissects  into the left lateral ventricle and there is a small amount of  intraventricular hemorrhage noted within the dependent aspect of the  left lateral ventricle. There is no evidence for hydrocephalus. Again,  there is ventricular as well  as sulcal effacement. Midline shift to the  right by approximately 4 mm is noted.     Moderate changes of chronic small vessel ischemic phenomena. 1.4 cm  chronic infarct within the right caudate head.     These findings were discussed with Dr. Quintero on 03/15/2018 at  approximately 9:20 AM.     Radiation dose reduction techniques were utilized, including automated  exposure control and exposure modulation based on body size.     This report was finalized on 3/15/2018 1:16 PM by Dr. Jigar Gonzalez MD.             ASSESSMENT:   Comfort measures  Large left hemispheric bleed with brain edema  Uncontrolled hypertension  Atrial fibrillation on anticoagulation  Supratherapeutic INR  Acute hypercapnic respiratory failure    PLAN:  Patient appears to be comfortable.  Had a long discussion with the family members at bedside were questioning if she needs a repeat CT.  I have told him that this would be more uncomfortable than help prognosticate.  The concern is to see how she is doing planning for future.  We will place consult for hospice    I have discussed my findings and recommendations with family and nursing staff.     Gilmer Bustos MD  3/16/2018

## 2018-03-16 NOTE — PROGRESS NOTES
Discharge Planning Assessment  [facility]     Patient Name: Nancy Monteiro  MRN: [id number]  Today's Date: 3/16/2018    Admit Date: 3/15/2018          Discharge Needs Assessment     Row Name 03/16/18 1503       Resource/Environmental Concerns    Transportation Concerns car, none       Transition Planning    Patient/Family Anticipates Transition to inpatient hospice       Discharge Needs Assessment    Equipment Currently Used at Home none            Discharge Plan     Row Name 03/16/18 1556       Plan    Plan Comments The patient was transferred to [facility] from ICU on 3/15/18 @ 19:00. The patient is palliative. No [facility] evaluation at this time. CCP will follow for any needs that may arise. SIRIA Martin RN, [facility]    Row Name 03/16/18 1513       Plan    Plan Comfort measures     Plan Comments Spoke with family at bedside, face sheet verified. Patient lives with her  Jules [phone]. Patient is comfort measures only. CCP left Bertha Martin's business card with the daughter at bedside for needs. Layne Hudson RN        Destination     No service coordination in this encounter.      Durable Medical Equipment     No service coordination in this encounter.      Dialysis/Infusion     No service coordination in this encounter.      Home Medical Care     No service coordination in this encounter.      Social Care     No service coordination in this encounter.                Demographic Summary     Row Name 03/16/18 0713       General Information    Admission Type inpatient    Arrived From home    Required Notices Provided Important Message from Medicare    Referral Source admission list    Reason for Consult discharge planning    Preferred Language English            Functional Status     Row Name 03/16/18 1500       Functional Status    Current Activity Tolerance other (see comments)   comfort measures             Psychosocial    No documentation.           Abuse/Neglect    No documentation.            Legal    No documentation.           Substance Abuse    No documentation.           Patient Forms    No documentation.         Bertha Martin RN

## 2018-03-16 NOTE — PLAN OF CARE
Problem: Patient Care Overview  Goal: Plan of Care Review  Outcome: Ongoing (interventions implemented as appropriate)   03/16/18 9801   Coping/Psychosocial   Plan of Care Reviewed With patient;family   Plan of Care Review   Progress declining   OTHER   Outcome Summary Pt premedicated for turns. Hospice consulted. Family at bedside. Pt appears comfortable at this time. Will continue to monitor per comfort care.      Goal: Individualization and Mutuality  Outcome: Ongoing (interventions implemented as appropriate)    Goal: Discharge Needs Assessment  Outcome: Ongoing (interventions implemented as appropriate)    Goal: Interprofessional Rounds/Family Conf  Outcome: Ongoing (interventions implemented as appropriate)      Problem: Skin Injury Risk (Adult)  Goal: Identify Related Risk Factors and Signs and Symptoms  Outcome: Ongoing (interventions implemented as appropriate)      Problem: Palliative Care (Adult)  Goal: Identify Related Risk Factors and Signs and Symptoms  Outcome: Ongoing (interventions implemented as appropriate)

## 2018-03-17 NOTE — PLAN OF CARE
Problem: Patient Care Overview  Goal: Plan of Care Review  Outcome: Ongoing (interventions implemented as appropriate)   03/17/18 1821   Coping/Psychosocial   Plan of Care Reviewed With family   Plan of Care Review   Progress declining   OTHER   Outcome Summary premedicated for turns, family at bedside, no change today will keep comfortable and monitor     Goal: Individualization and Mutuality  Outcome: Ongoing (interventions implemented as appropriate)      Problem: Skin Injury Risk (Adult)  Goal: Identify Related Risk Factors and Signs and Symptoms  Outcome: Outcome(s) achieved Date Met: 03/17/18    Goal: Skin Health and Integrity  Outcome: Ongoing (interventions implemented as appropriate)      Problem: Fall Risk (Adult)  Goal: Identify Related Risk Factors and Signs and Symptoms  Outcome: Outcome(s) achieved Date Met: 03/17/18    Goal: Absence of Fall  Outcome: Ongoing (interventions implemented as appropriate)      Problem: Palliative Care (Adult)  Goal: Identify Related Risk Factors and Signs and Symptoms  Outcome: Outcome(s) achieved Date Met: 03/17/18    Goal: Maximized Comfort  Outcome: Ongoing (interventions implemented as appropriate)    Goal: Enhanced Quality of Life  Outcome: Ongoing (interventions implemented as appropriate)

## 2018-03-17 NOTE — PLAN OF CARE
Problem: Patient Care Overview  Goal: Plan of Care Review  Outcome: Ongoing (interventions implemented as appropriate)   03/16/18 1644 03/16/18 2103 03/17/18 0537   Coping/Psychosocial   Plan of Care Reviewed With --  patient;daughter --    Plan of Care Review   Progress declining --  --    OTHER   Outcome Summary --  --  Maintained comfort measures per palliative care protocol. Patient was medicated PRN with Ativan and Morphine. Daughter at bedside. Will continue to monitor vital signs and comfort.     Goal: Individualization and Mutuality  Outcome: Ongoing (interventions implemented as appropriate)    Goal: Discharge Needs Assessment  Outcome: Ongoing (interventions implemented as appropriate)      Problem: Skin Injury Risk (Adult)  Goal: Skin Health and Integrity  Outcome: Ongoing (interventions implemented as appropriate)      Problem: Fall Risk (Adult)  Goal: Identify Related Risk Factors and Signs and Symptoms  Outcome: Ongoing (interventions implemented as appropriate)    Goal: Absence of Fall  Outcome: Ongoing (interventions implemented as appropriate)      Problem: Palliative Care (Adult)  Goal: Identify Related Risk Factors and Signs and Symptoms  Outcome: Ongoing (interventions implemented as appropriate)    Goal: Maximized Comfort  Outcome: Ongoing (interventions implemented as appropriate)    Goal: Enhanced Quality of Life  Outcome: Ongoing (interventions implemented as appropriate)

## 2018-03-17 NOTE — PROGRESS NOTES
LOS: 2 days   Patient Care Team:  Venancio Rios MD as PCP - General (Family Medicine)    Subjective     Patient is unresponsive  is at bedside I discussed with him he feels that the nurses are doing a fantastic job at keeping her comfortable keeping her clean and he expresses no needs    Review of Systems:          Objective     Vital Signs  Vital Sign Min/Max for last 24 hours  Temp  Min: 100.1 °F (37.8 °C)  Max: 101.4 °F (38.6 °C)   BP  Min: 114/77  Max: 132/85   Pulse  Min: 120  Max: 151   Resp  Min: 16  Max: 16   SpO2  Min: 90 %  Max: 92 %   No Data Recorded   No Data Recorded        Ventilator/Non-Invasive Ventilation Settings     None                       Body mass index is 20.74 kg/m².  I/O last 3 completed shifts:  In: 0   Out: 275 [Urine:275]  No intake/output data recorded.        Physical Exam:  General Appearance: Elderly white female resting in bed completely unresponsive  Lungs: Very shallow slow respirations  Cardiac: Tachycardic and irregular heart rates in the 140s to 150s  Abdomen: Soft  Skin: Hot to touch no jaundice  Neuro: Completely unresponsive  Extremities/P Vascular: No edema palpable radial and dorsalis pedis pulses bilaterally         Labs:    Results from last 7 days  Lab Units 03/15/18  0846   GLUCOSE mg/dL 152*   SODIUM mmol/L 142   POTASSIUM mmol/L 3.5   CO2 mmol/L 23.1   CHLORIDE mmol/L 101   ANION GAP mmol/L 17.9   CREATININE mg/dL 1.00   BUN mg/dL 15   BUN / CREAT RATIO  15.0   CALCIUM mg/dL 10.1   EGFR IF NONAFRICN AM mL/min/1.73 54*     Estimated Creatinine Clearance: 44.7 mL/min (by C-G formula based on SCr of 1 mg/dL).        Results from last 7 days  Lab Units 03/15/18  0846   WBC 10*3/mm3 9.99   RBC 10*6/mm3 5.46*   HEMOGLOBIN g/dL 16.6*   HEMATOCRIT % 49.2*   MCV fL 90.1   MCH pg 30.4   MCHC g/dL 33.7   RDW % 14.1*   RDW-SD fl 45.3   MPV fL 10.4   PLATELETS 10*3/mm3 177       Results from last 7 days  Lab Units 03/15/18  0901   PH, ARTERIAL pH units  7.423   PO2 ART mm Hg 595.7*   PCO2, ARTERIAL mm Hg 37.7   HCO3 ART mmol/L 24.6       Results from last 7 days  Lab Units 03/15/18  0846   TROPONIN T ng/mL <0.010                   Results from last 7 days  Lab Units 03/15/18  0846   INR  2.95*     Microbiology Results (last 10 days)     ** No results found for the last 240 hours. **                      Diagnostics:  Ct Head Without Contrast    Result Date: 3/15/2018  CT HEAD WITHOUT CONTRAST  CLINICAL HISTORY: Intracranial hemorrhage.  TECHNIQUE: CT scan of the head was obtained with 3 mm axial images. No intravenous contrast was administered.  FINDINGS:  There is acute intraparenchymal hemorrhage within the left lentiform nucleus, anterior limb of the right internal capsule, and the right caudate head. This focus of acute intraparenchymal hemorrhage measures up to approximately 5.3 x 2.9 cm in greatest axial dimensions. This hemorrhage dissects into the left lateral ventricle and there is a small amount of intraventricular hemorrhage identified within the dependent aspect of the left lateral ventricle. There is mass effect with sulcal as well as ventricular effacement. Midline shift to the right by approximately 4 mm is seen. There are moderate changes of chronic small vessel ischemic phenomena. Chronic infarct is identified within the right caudate head measuring up to 1.4 cm in diameter. Atherosclerotic changes are appreciated within the intracranial vasculature.       There are no prior imaging studies available for comparison. The current head CT demonstrates a focus of dense and presumably acute intraparenchymal hemorrhage within the left lentiform nucleus, anterior limb of the left internal capsule, and left caudate head. This dissects into the left lateral ventricle and there is a small amount of intraventricular hemorrhage noted within the dependent aspect of the left lateral ventricle. There is no evidence for hydrocephalus. Again, there is ventricular  as well as sulcal effacement. Midline shift to the right by approximately 4 mm is noted.  Moderate changes of chronic small vessel ischemic phenomena. 1.4 cm chronic infarct within the right caudate head.  These findings were discussed with Dr. Quintero on 03/15/2018 at approximately 9:20 AM.  Radiation dose reduction techniques were utilized, including automated exposure control and exposure modulation based on body size.  This report was finalized on 3/15/2018 1:16 PM by Dr. Jigar Gonzalez MD.               Active Hospital Problems (** Indicates Principal Problem)    Diagnosis Date Noted   • Intracerebral bleed [I61.9] 03/15/2018      Resolved Hospital Problems    Diagnosis Date Noted Date Resolved   No resolved problems to display.         Assessment/Plan     1. Left hemispheric intracranial hemorrhage with cerebral edema  2. Uncontrolled hypertension  3. Atrial fibrillation  4. Acute hypercapnic respiratory failure  5. Fever could be central could be aspiration related  6. Palliative care family to discuss hospice on Monday if she survives    Plan for disposition:Continue palliative care she is not expected to survive too long and current condition    Dequan Hernandes MD  03/17/18  11:44 AM    Time:

## 2018-03-18 NOTE — PLAN OF CARE
Problem: Patient Care Overview  Goal: Plan of Care Review  Outcome: Ongoing (interventions implemented as appropriate)   03/18/18 1150   Coping/Psychosocial   Plan of Care Reviewed With patient   Plan of Care Review   Progress no change   OTHER   Outcome Summary Medicated for turns, family at bedside, pt resting comfortably, family at bedside will continue palliative care per md order      Goal: Individualization and Mutuality  Outcome: Ongoing (interventions implemented as appropriate)      Problem: Skin Injury Risk (Adult)  Goal: Skin Health and Integrity  Outcome: Ongoing (interventions implemented as appropriate)      Problem: Fall Risk (Adult)  Goal: Absence of Fall  Outcome: Ongoing (interventions implemented as appropriate)      Problem: Palliative Care (Adult)  Goal: Maximized Comfort  Outcome: Ongoing (interventions implemented as appropriate)    Goal: Enhanced Quality of Life  Outcome: Ongoing (interventions implemented as appropriate)

## 2018-03-18 NOTE — PLAN OF CARE
Problem: Patient Care Overview  Goal: Plan of Care Review  Outcome: Ongoing (interventions implemented as appropriate)   03/17/18 1821 03/17/18 1954 03/18/18 0533   Coping/Psychosocial   Plan of Care Reviewed With --  spouse;son --    Plan of Care Review   Progress declining --  --    OTHER   Outcome Summary --  --  Maintained comfort measures per palliative care protocol. Patient is premedicated prior to turns. Son stayed at bedside all night. Will continue to monitor vital signs and comfort.     Goal: Individualization and Mutuality  Outcome: Ongoing (interventions implemented as appropriate)    Goal: Discharge Needs Assessment  Outcome: Ongoing (interventions implemented as appropriate)      Problem: Skin Injury Risk (Adult)  Goal: Skin Health and Integrity  Outcome: Ongoing (interventions implemented as appropriate)      Problem: Fall Risk (Adult)  Goal: Absence of Fall  Outcome: Ongoing (interventions implemented as appropriate)

## 2018-03-18 NOTE — PROGRESS NOTES
LOS: 3 days   Patient Care Team:  Venancio Rios MD as PCP - General (Family Medicine)    Subjective     Patient is unresponsive  is at bedside he still very happy with her care and expresses no needs at this time    Review of Systems:          Objective     Vital Signs  Vital Sign Min/Max for last 24 hours  Temp  Min: 101.5 °F (38.6 °C)  Max: 102.7 °F (39.3 °C)   BP  Min: 114/72  Max: 132/90   Pulse  Min: 138  Max: 148   Resp  Min: 16  Max: 36   SpO2  Min: 89 %  Max: 90 %   No Data Recorded   No Data Recorded        Ventilator/Non-Invasive Ventilation Settings     None                       Body mass index is 20.74 kg/m².  I/O last 3 completed shifts:  In: 0   Out: 475 [Urine:475]  No intake/output data recorded.        Physical Exam:  General Appearance: Elderly white female resting in bed completely unresponsive  Lungs: Very shallow slow respirations  Cardiac: Tachycardic and irregular heart rates in the 140s to 150s  Abdomen: Soft  Skin: Hot to touch no jaundice  Neuro: Completely unresponsive, pupils are about 2 mm I didn't get any reaction to light the right is deviated to the right the left is more midline  Extremities/P Vascular: No edema palpable radial and dorsalis pedis pulses bilaterally         Labs:    Results from last 7 days  Lab Units 03/15/18  0846   GLUCOSE mg/dL 152*   SODIUM mmol/L 142   POTASSIUM mmol/L 3.5   CO2 mmol/L 23.1   CHLORIDE mmol/L 101   ANION GAP mmol/L 17.9   CREATININE mg/dL 1.00   BUN mg/dL 15   BUN / CREAT RATIO  15.0   CALCIUM mg/dL 10.1   EGFR IF NONAFRICN AM mL/min/1.73 54*     Estimated Creatinine Clearance: 44.7 mL/min (by C-G formula based on SCr of 1 mg/dL).        Results from last 7 days  Lab Units 03/15/18  0846   WBC 10*3/mm3 9.99   RBC 10*6/mm3 5.46*   HEMOGLOBIN g/dL 16.6*   HEMATOCRIT % 49.2*   MCV fL 90.1   MCH pg 30.4   MCHC g/dL 33.7   RDW % 14.1*   RDW-SD fl 45.3   MPV fL 10.4   PLATELETS 10*3/mm3 177       Results from last 7 days  Lab  Units 03/15/18  0901   PH, ARTERIAL pH units 7.423   PO2 ART mm Hg 595.7*   PCO2, ARTERIAL mm Hg 37.7   HCO3 ART mmol/L 24.6       Results from last 7 days  Lab Units 03/15/18  0846   TROPONIN T ng/mL <0.010                   Results from last 7 days  Lab Units 03/15/18  0846   INR  2.95*     Microbiology Results (last 10 days)     ** No results found for the last 240 hours. **                      Diagnostics:  Ct Head Without Contrast    Result Date: 3/15/2018  CT HEAD WITHOUT CONTRAST  CLINICAL HISTORY: Intracranial hemorrhage.  TECHNIQUE: CT scan of the head was obtained with 3 mm axial images. No intravenous contrast was administered.  FINDINGS:  There is acute intraparenchymal hemorrhage within the left lentiform nucleus, anterior limb of the right internal capsule, and the right caudate head. This focus of acute intraparenchymal hemorrhage measures up to approximately 5.3 x 2.9 cm in greatest axial dimensions. This hemorrhage dissects into the left lateral ventricle and there is a small amount of intraventricular hemorrhage identified within the dependent aspect of the left lateral ventricle. There is mass effect with sulcal as well as ventricular effacement. Midline shift to the right by approximately 4 mm is seen. There are moderate changes of chronic small vessel ischemic phenomena. Chronic infarct is identified within the right caudate head measuring up to 1.4 cm in diameter. Atherosclerotic changes are appreciated within the intracranial vasculature.       There are no prior imaging studies available for comparison. The current head CT demonstrates a focus of dense and presumably acute intraparenchymal hemorrhage within the left lentiform nucleus, anterior limb of the left internal capsule, and left caudate head. This dissects into the left lateral ventricle and there is a small amount of intraventricular hemorrhage noted within the dependent aspect of the left lateral ventricle. There is no evidence for  hydrocephalus. Again, there is ventricular as well as sulcal effacement. Midline shift to the right by approximately 4 mm is noted.  Moderate changes of chronic small vessel ischemic phenomena. 1.4 cm chronic infarct within the right caudate head.  These findings were discussed with Dr. Quintero on 03/15/2018 at approximately 9:20 AM.  Radiation dose reduction techniques were utilized, including automated exposure control and exposure modulation based on body size.  This report was finalized on 3/15/2018 1:16 PM by Dr. Jigar Gonzalez MD.               Active Hospital Problems (** Indicates Principal Problem)    Diagnosis Date Noted   • Intracerebral bleed [I61.9] 03/15/2018      Resolved Hospital Problems    Diagnosis Date Noted Date Resolved   No resolved problems to display.         Assessment/Plan     1. Left hemispheric intracranial hemorrhage with cerebral edema  2. Uncontrolled hypertension  3. Atrial fibrillation  4. Acute hypercapnic respiratory failure  5. Fever could be central could be aspiration related  6. Palliative care family to discuss hospice on Monday if she survives    Plan for disposition:Continue palliative care she is not expected to survive too long and current condition    Dequan Hernandes MD  03/18/18  11:11 AM    Time:

## 2018-03-19 PROBLEM — Z51.5 ADMISSION FOR HOSPICE CARE: Status: ACTIVE | Noted: 2018-01-01

## 2018-03-19 NOTE — PROGRESS NOTES
Case Management Discharge Note    Final Note: Admitted to a [facility] scattered [facility] on 3/19/18. SIRIA Martin RN, CCP.    Destination     Service Request Status Selected Specialties Address Phone Number Fax Number    [facility] Accepted N/A [id number], [facility] [id number] [phone]      Durable Medical Equipment     No service coordination in this encounter.      Dialysis/Infusion     No service coordination in this encounter.      Home Medical Care     No service coordination in this encounter.      Social Care     No service coordination in this encounter.             Final Discharge Disposition Code: 51 - hospice medical facility

## 2018-03-19 NOTE — DISCHARGE SUMMARY
Date of Discharge:  3/19/2018    Discharge Diagnoses:  1.  Left hemispheric intracranial hemorrhage with cerebral edema  2. Uncontrolled hypertension  3. Atrial fibrillation  4. Fever  5. Palliative care      Hospital Course  Patient is a 77 y.o. female presented with presented with a massive left hemispheric CVA.  She did not do well neurosurgery spoke with the  and she was made palliative care.  She is being discharged to hospice scattered bed.      Procedures Performed         Consults:   Consults     Date and Time Order Name Status Description    3/15/2018 0825 Inpatient Consult to Neurosurgery            Pertinent Test Results:   Labs:    Results from last 7 days  Lab Units 03/15/18  0846   GLUCOSE mg/dL 152*   SODIUM mmol/L 142   POTASSIUM mmol/L 3.5   CO2 mmol/L 23.1   CHLORIDE mmol/L 101   ANION GAP mmol/L 17.9   CREATININE mg/dL 1.00   BUN mg/dL 15   BUN / CREAT RATIO  15.0   CALCIUM mg/dL 10.1   EGFR IF NONAFRICN AM mL/min/1.73 54*     Estimated Creatinine Clearance: 44.7 mL/min (by C-G formula based on SCr of 1 mg/dL).        Results from last 7 days  Lab Units 03/15/18  0846   WBC 10*3/mm3 9.99   RBC 10*6/mm3 5.46*   HEMOGLOBIN g/dL 16.6*   HEMATOCRIT % 49.2*   MCV fL 90.1   MCH pg 30.4   MCHC g/dL 33.7   RDW % 14.1*   RDW-SD fl 45.3   MPV fL 10.4   PLATELETS 10*3/mm3 177       Results from last 7 days  Lab Units 03/15/18  0901   PH, ARTERIAL pH units 7.423   PO2 ART mm Hg 595.7*   PCO2, ARTERIAL mm Hg 37.7   HCO3 ART mmol/L 24.6       Results from last 7 days  Lab Units 03/15/18  0846   TROPONIN T ng/mL <0.010                   Results from last 7 days  Lab Units 03/15/18  0846   INR  2.95*       Imaging Results (last 72 hours)     ** No results found for the last 72 hours. **                 Condition on Discharge:  Terminal    Vital Signs  Temp:  [102.6 °F (39.2 °C)-103.4 °F (39.7 °C)] 103.4 °F (39.7 °C)  Heart Rate:  [141-157] 157  Resp:  [14-16] 14  BP: (122-128)/(77-81)  128/81    Physical Exam:    General Appearance: Elderly white female resting in bed completely unresponsive  Lungs: Very shallow slow respirations  Cardiac: Tachycardic and irregular heart rates in the 140s to 150s  Abdomen: Soft  Skin: Hot to touch no jaundice  Neuro: Completely unresponsive, pupils are about 2 mm I didn't get any reaction to light the right is deviated to the right the left is more midline  Extremities/P Vascular: No edema palpable radial and dorsalis pedis pulses bilaterally    Discharge Disposition      Discharge Medications   Nancy Monteiro   Home Medication Instructions THOM:[id number]    Printed on:03/19/18 0348   Medication Information                      amLODIPine (NORVASC) 5 MG tablet  Take by mouth.             aspirin 81 MG tablet  Take 81 mg by mouth daily.             baclofen (LIORESAL) 10 MG tablet               CARTIA  MG 24 hr capsule               celecoxib (CeleBREX) 200 MG capsule               DENTA 5000 PLUS 1.1 % cream               diazepam (VALIUM) 2 MG tablet               diazepam (VALIUM) 5 MG tablet  Take by mouth. TAKE 1-2 TABS 30 MIN BEFORE MRI             diazepam (VALIUM) 5 MG tablet  Take 5 mg by mouth every 8 (eight) hours as needed for anxiety.             dicyclomine (BENTYL) 10 MG capsule               gabapentin (NEURONTIN) 300 MG capsule               HYDROcodone-acetaminophen (NORCO) 7.5-325 MG per tablet               hyoscyamine (LEVSIN) 0.125 MG SL tablet               lisinopril (PRINIVIL,ZESTRIL) 20 MG tablet               lisinopril (PRINIVIL,ZESTRIL) 40 MG tablet               metoprolol tartrate (LOPRESSOR) 100 MG tablet  Take by mouth.             metoprolol tartrate (LOPRESSOR) 50 MG tablet  Take 50 mg by mouth 2 (two) times a day.             naproxen (NAPROSYN) 500 MG tablet  Take 500 mg by mouth 2 (two) times a day with meals.             NITROSTAT 0.4 MG SL tablet               omeprazole (PriLOSEC) 40 MG capsule                ondansetron (ZOFRAN) 4 MG tablet               ondansetron ODT (ZOFRAN-ODT) 4 MG disintegrating tablet  Take 4 mg by mouth every 8 (eight) hours as needed for nausea or vomiting.             pantoprazole (PROTONIX) 40 MG EC tablet  Take 40 mg by mouth daily.             polyethylene glycol (MIRALAX) powder               traMADol (ULTRAM) 50 MG tablet  Take by mouth.             warfarin (COUMADIN) 3 MG tablet                   Discharge Diet:     Activity at Discharge:     Follow-up Appointments  No future appointments.      Test Results Pending at Discharge       Dequan Hernandes MD  03/19/18  2:19 PM    Time:

## 2018-03-19 NOTE — CONSULTS
Arrived to unit, reviewed records, Spoke with FRANCO Gomez to discuss patient's disease progression. Met with patient's spouse-Jules away from bedside per his request to discuss patient's prognosis and goals of care. Provided detailed EOS. Family agreeable to [facility] scattered bed admission. Obtained consents via Spouse-Jules. Spoke with HMD who confirmed pt is HSB appropriate with primary diagnosis of Intracerebral hemorrhage (([id number]). Spoke with Dr. Scott Hernandes who confirmed he would admit patient to [facility] scattered bed today. Updated FRANCO Gomez and Philomena CARUSO. [facility] nurse will f/u tomorrow.     Thanks for the referral and the opportunity to care for this patient,    Miguel Zhu RN, BSN  Referral and admission coordinator  [phone]

## 2018-03-19 NOTE — PLAN OF CARE
Problem: Patient Care Overview  Goal: Plan of Care Review  Outcome: Ongoing (interventions implemented as appropriate)   03/18/18 1855 03/18/18 2329 03/19/18 0517   Coping/Psychosocial   Plan of Care Reviewed With --  spouse --    Plan of Care Review   Progress no change --  --    OTHER   Outcome Summary --  --  Pt rested well with spouse at bedside. Medicated prior to Q4 turns. New IV placed. Continue comfort care.      Goal: Individualization and Mutuality  Outcome: Ongoing (interventions implemented as appropriate)    Goal: Discharge Needs Assessment  Outcome: Ongoing (interventions implemented as appropriate)      Problem: Skin Injury Risk (Adult)  Goal: Skin Health and Integrity  Outcome: Ongoing (interventions implemented as appropriate)      Problem: Fall Risk (Adult)  Goal: Absence of Fall  Outcome: Ongoing (interventions implemented as appropriate)      Problem: Palliative Care (Adult)  Goal: Maximized Comfort  Outcome: Ongoing (interventions implemented as appropriate)    Goal: Enhanced Quality of Life  Outcome: Ongoing (interventions implemented as appropriate)

## 2018-03-19 NOTE — H&P
"              Patient Care Team:  Venancio Rios MD as PCP - General (Family Medicine)      Subjective     Patient is a 77 y.o. female.  With a massive left intra-hemispheric bleed on palliative care transitioning to hospice scattered bed      Review of Systems:        History  Past Medical History:   Diagnosis Date   • CVA (cerebral infarction)    • Diverticular disease    • DJD (degenerative joint disease)    • Hypertension      Past Surgical History:   Procedure Laterality Date   • BREAST SURGERY Right    • BUNIONECTOMY Right 2015   • CATARACT EXTRACTION Bilateral 2012   • COLON SURGERY  2006   • EYE SURGERY      LASER   • FOOT SURGERY      R MIDDLE TOE \"CYST\" 3/9/2006   • HERNIA REPAIR     • TOTAL KNEE ARTHROPLASTY Right 07/2015     Social History     Social History   • Marital status:      Occupational History   • Homemaker       Social History Main Topics   • Smoking status: Never Smoker   • Alcohol use No   • Drug use: No     Other Topics Concern   • Not on file     Family History   Problem Relation Age of Onset   • Other Mother      HEART PROBLEM   • Diabetes Father    • Other Father      HEART PROBLEM   • Prostate cancer Brother          Allergies:  Aliskiren; Amoxicillin; Atenolol; Bisoprolol; Candesartan; Cardizem  [diltiazem]; Clonidine; Ezetimibe; Fenofibrate; Gabapentin; Gemfibrozil; Hydrochlorothiazide; Lisinopril; Lovaza  [omega-3-acid ethyl esters]; Meloxicam; Naproxen; Nebivolol hcl; Niacin; Norvasc  [amlodipine]; Oxytetracycline; Pramipexole; Pravastatin; Telmisartan; Keflex  [cephalexin]; and Sulfa antibiotics    Medications:  Prior to Admission medications    Medication Sig Start Date End Date Taking? Authorizing Provider   amLODIPine (NORVASC) 5 MG tablet Take by mouth. 6/1/15   Historical Provider, MD   aspirin 81 MG tablet Take 81 mg by mouth daily.    Historical Provider, MD   baclofen (LIORESAL) 10 MG tablet  12/23/15   Historical Provider, MD NEVES  MG 24 hr capsule "  1/11/16   Historical Provider, MD   celecoxib (CeleBREX) 200 MG capsule  2/9/16   Historical Provider, MD   DENTA 5000 PLUS 1.1 % cream  1/8/16   Historical Provider, MD   diazepam (VALIUM) 2 MG tablet  2/22/16   Historical Provider, MD   diazepam (VALIUM) 5 MG tablet Take by mouth. TAKE 1-2 TABS 30 MIN BEFORE MRI 6/4/15   Sujit Salvador MD   diazepam (VALIUM) 5 MG tablet Take 5 mg by mouth every 8 (eight) hours as needed for anxiety.    Historical Provider, MD   dicyclomine (BENTYL) 10 MG capsule  1/15/16   Historical Provider, MD   gabapentin (NEURONTIN) 300 MG capsule  12/3/15   Historical Provider, MD   HYDROcodone-acetaminophen (NORCO) 7.5-325 MG per tablet  2/17/16   Historical Provider, MD   hyoscyamine (LEVSIN) 0.125 MG SL tablet  2/11/16   Historical Provider, MD   lisinopril (PRINIVIL,ZESTRIL) 20 MG tablet  1/13/16   Historical Provider, MD   lisinopril (PRINIVIL,ZESTRIL) 40 MG tablet  1/18/16   Historical Provider, MD   metoprolol tartrate (LOPRESSOR) 100 MG tablet Take by mouth. 6/1/15   Historical Provider, MD   metoprolol tartrate (LOPRESSOR) 50 MG tablet Take 50 mg by mouth 2 (two) times a day. 2/11/16   Historical Provider, MD   naproxen (NAPROSYN) 500 MG tablet Take 500 mg by mouth 2 (two) times a day with meals.    Historical Provider, MD   NITROSTAT 0.4 MG SL tablet  12/17/15   Historical Provider, MD   omeprazole (PriLOSEC) 40 MG capsule  2/25/16   Historical Provider, MD   ondansetron (ZOFRAN) 4 MG tablet  2/25/16   Historical Provider, MD   ondansetron ODT (ZOFRAN-ODT) 4 MG disintegrating tablet Take 4 mg by mouth every 8 (eight) hours as needed for nausea or vomiting.    Historical Provider, MD   pantoprazole (PROTONIX) 40 MG EC tablet Take 40 mg by mouth daily.    Historical Provider, MD   polyethylene glycol (MIRALAX) powder  2/11/16   Historical Provider, MD   traMADol (ULTRAM) 50 MG tablet Take by mouth. 6/4/15   Historical Provider, MD   warfarin (COUMADIN) 3 MG tablet  1/22/16    "Historical Provider, MD             Objective     Vital Signs  Vital Sign Min/Max for last 24 hours  Temp  Min: 102.6 °F (39.2 °C)  Max: 103.4 °F (39.7 °C)   BP  Min: 122/77  Max: 128/81   Pulse  Min: 141  Max: 157   Resp  Min: 14  Max: 16   SpO2  Min: 66 %  Max: 89 %   No Data Recorded   No Data Recorded       Intake/Output Summary (Last 24 hours) at 03/19/18 1428  Last data filed at 03/19/18 0317   Gross per 24 hour   Intake                0 ml   Output              400 ml   Net             -400 ml     No intake/output data recorded.  Last Weight and Admission Weight    1    03/15/18  0827   Weight: 58.3 kg (128 lb 8.5 oz)     Flowsheet Rows    Flowsheet Row First Filed Value   Admission Height 167.6 cm (66\") Documented at 03/15/2018 0827   Admission Weight 53 kg (116 lb 13.5 oz) Documented at 03/15/2018 0824          Body mass index is 20.74 kg/m².           Physical Exam:    General Appearance: Elderly white female resting in bed completely unresponsive  Lungs: Very shallow slow respirations  Cardiac: Tachycardic and irregular heart rates in the 140s to 150s  Abdomen: Soft  Skin: Hot to touch no jaundice  Neuro: Completely unresponsive, pupils are about 2 mm I didn't get any reaction to light the right is deviated to the right the left is more midline  Extremities/P Vascular: No edema palpable radial and dorsalis pedis pulses bilaterally    Labs:    Results from last 7 days  Lab Units 03/15/18  0846   GLUCOSE mg/dL 152*   SODIUM mmol/L 142   POTASSIUM mmol/L 3.5   CO2 mmol/L 23.1   CHLORIDE mmol/L 101   ANION GAP mmol/L 17.9   CREATININE mg/dL 1.00   BUN mg/dL 15   BUN / CREAT RATIO  15.0   CALCIUM mg/dL 10.1   EGFR IF NONAFRICN AM mL/min/1.73 54*     Estimated Creatinine Clearance: 44.7 mL/min (by C-G formula based on SCr of 1 mg/dL).        Results from last 7 days  Lab Units 03/15/18  0846   WBC 10*3/mm3 9.99   RBC 10*6/mm3 5.46*   HEMOGLOBIN g/dL 16.6*   HEMATOCRIT % 49.2*   MCV fL 90.1   MCH pg 30.4   MCHC " g/dL 33.7   RDW % 14.1*   RDW-SD fl 45.3   MPV fL 10.4   PLATELETS 10*3/mm3 177       Results from last 7 days  Lab Units 03/15/18  0901   PH, ARTERIAL pH units 7.423   PO2 ART mm Hg 595.7*   PCO2, ARTERIAL mm Hg 37.7   HCO3 ART mmol/L 24.6       Results from last 7 days  Lab Units 03/15/18  0846   TROPONIN T ng/mL <0.010                   Results from last 7 days  Lab Units 03/15/18  0846   INR  2.95*     Microbiology Results (last 10 days)     ** No results found for the last 240 hours. **            Diagnostics:  Ct Head Without Contrast    Result Date: 3/15/2018  CT HEAD WITHOUT CONTRAST  CLINICAL HISTORY: Intracranial hemorrhage.  TECHNIQUE: CT scan of the head was obtained with 3 mm axial images. No intravenous contrast was administered.  FINDINGS:  There is acute intraparenchymal hemorrhage within the left lentiform nucleus, anterior limb of the right internal capsule, and the right caudate head. This focus of acute intraparenchymal hemorrhage measures up to approximately 5.3 x 2.9 cm in greatest axial dimensions. This hemorrhage dissects into the left lateral ventricle and there is a small amount of intraventricular hemorrhage identified within the dependent aspect of the left lateral ventricle. There is mass effect with sulcal as well as ventricular effacement. Midline shift to the right by approximately 4 mm is seen. There are moderate changes of chronic small vessel ischemic phenomena. Chronic infarct is identified within the right caudate head measuring up to 1.4 cm in diameter. Atherosclerotic changes are appreciated within the intracranial vasculature.       There are no prior imaging studies available for comparison. The current head CT demonstrates a focus of dense and presumably acute intraparenchymal hemorrhage within the left lentiform nucleus, anterior limb of the left internal capsule, and left caudate head. This dissects into the left lateral ventricle and there is a small amount of  intraventricular hemorrhage noted within the dependent aspect of the left lateral ventricle. There is no evidence for hydrocephalus. Again, there is ventricular as well as sulcal effacement. Midline shift to the right by approximately 4 mm is noted.  Moderate changes of chronic small vessel ischemic phenomena. 1.4 cm chronic infarct within the right caudate head.  These findings were discussed with Dr. Quintero on 03/15/2018 at approximately 9:20 AM.  Radiation dose reduction techniques were utilized, including automated exposure control and exposure modulation based on body size.  This report was finalized on 3/15/2018 1:16 PM by Dr. Jigar Gonzalez MD.               Assessment/Plan     1. Left hemispheric intracranial hemorrhage with cerebral edema  2. Uncontrolled hypertension  3. Atrial fibrillation  4. Acute hypercapnic respiratory failure  5. Fever could be central could be aspiration related  6. Palliative care family hospice scattered bed         Dequan Hernandes MD  03/19/18  2:28 PM    Time:

## 2018-03-19 NOTE — PLAN OF CARE
Problem: Fall Risk (Adult)  Goal: Absence of Fall  Outcome: Ongoing (interventions implemented as appropriate)      Problem: Palliative Care (Adult)  Goal: Maximized Comfort  Outcome: Ongoing (interventions implemented as appropriate)    Goal: Enhanced Quality of Life  Outcome: Ongoing (interventions implemented as appropriate)      Problem: Patient Care Overview  Goal: Plan of Care Review  Outcome: Ongoing (interventions implemented as appropriate)   03/19/18 1633   Coping/Psychosocial   Plan of Care Reviewed With patient;family   Plan of Care Review   Progress declining   OTHER   Outcome Summary Pt premedicated for turns and SOA. Family at bedside. Pt flipped to HSB. Will continue to monitor per comfort care.      Goal: Individualization and Mutuality  Outcome: Ongoing (interventions implemented as appropriate)    Goal: Discharge Needs Assessment  Outcome: Ongoing (interventions implemented as appropriate)    Goal: Interprofessional Rounds/Family Conf  Outcome: Ongoing (interventions implemented as appropriate)      Problem: Skin Injury Risk (Adult)  Goal: Skin Health and Integrity  Outcome: Ongoing (interventions implemented as appropriate)      Problem: Dying Patient, Actively (Adult)  Goal: Identify Related Risk Factors and Signs and Symptoms  Outcome: Ongoing (interventions implemented as appropriate)    Goal: Comfort/Pain Control  Outcome: Ongoing (interventions implemented as appropriate)    Goal: Peace/Preservation of Dignity During the Dying Process  Outcome: Ongoing (interventions implemented as appropriate)

## 2018-03-20 NOTE — PLAN OF CARE
Problem: Fall Risk (Adult)  Goal: Absence of Fall  Outcome: Ongoing (interventions implemented as appropriate)      Problem: Palliative Care (Adult)  Goal: Maximized Comfort  Outcome: Ongoing (interventions implemented as appropriate)    Goal: Enhanced Quality of Life  Outcome: Ongoing (interventions implemented as appropriate)      Problem: Patient Care Overview  Goal: Plan of Care Review  Outcome: Outcome(s) achieved Date Met: 03/20/18 03/19/18 1633 03/19/18 2021 03/20/18 0616   Coping/Psychosocial   Plan of Care Reviewed With --  family --    Plan of Care Review   Progress declining --  --    OTHER   Outcome Summary --  --  Maintained comfort measures per palliative care protocol. Daughter stayed at bedside.Patiet is pre medicated prior to turns. Will continue to monitor vi   03/19/18 1633 03/19/18 2021 03/20/18 0616   Coping/Psychosocial   Plan of Care Reviewed With --  family --    Plan of Care Review   Progress declining --  --    OTHER   Outcome Summary --  --  Maintained comfort measures per palliative care protocol. Daughter stayed at bedside.Patiet is pre medicated prior to turns. Will continue to monitor vital signs and comfort.   doni signs and comfort.     Goal: Individualization and Mutuality  Outcome: Ongoing (interventions implemented as appropriate)    Goal: Discharge Needs Assessment  Outcome: Ongoing (interventions implemented as appropriate)      Problem: Skin Injury Risk (Adult)  Goal: Skin Health and Integrity  Outcome: Ongoing (interventions implemented as appropriate)      Problem: Dying Patient, Actively (Adult)  Goal: Identify Related Risk Factors and Signs and Symptoms  Outcome: Ongoing (interventions implemented as appropriate)    Goal: Comfort/Pain Control  Outcome: Ongoing (interventions implemented as appropriate)    Goal: Peace/Preservation of Dignity During the Dying Process  Outcome: Ongoing (interventions implemented as appropriate)

## 2018-03-20 NOTE — PROGRESS NOTES
Case Management Discharge Note    Final Note: The patient  on 3/20/18 @ 07:30. SIRIA Martin RN, CCP    Destination - Selection Complete     Service Request Status Selected Specialties Address Phone Number Fax Number    [facility]osp[facility] [phone], [facility] [phone]      Durable Medical Equipment     No service coordination in this encounter.      Dialysis/Infusion     No service coordination in this encounter.      Home Medical Care     No service coordination in this encounter.      Social Care     No service coordination in this encounter.             Final Discharge Disposition Code: 41 -  in medical facility

## 2018-03-20 NOTE — DISCHARGE SUMMARY
Date of Discharge:  3/20/2018    Discharge Diagnoses:  1.  Left hemispheric intracranial hemorrhage with cerebral edema  2. Uncontrolled hypertension  3. Atrial fibrillation  4. Fever  5. Palliative care      Hospital Course  Patient is a 77 y.o. female presented with presented with a massive left hemispheric CVA.  She did not do well neurosurgery spoke with the  and she was made palliative care.  Patient was transferred to the hospice service and  on hospitalist service today    Procedures Performed         Consults:   Consults     No orders found for last 30 day(s).          Pertinent Test Results:   Labs:    Results from last 7 days  Lab Units 03/15/18  0846   GLUCOSE mg/dL 152*   SODIUM mmol/L 142   POTASSIUM mmol/L 3.5   CO2 mmol/L 23.1   CHLORIDE mmol/L 101   ANION GAP mmol/L 17.9   CREATININE mg/dL 1.00   BUN mg/dL 15   BUN / CREAT RATIO  15.0   CALCIUM mg/dL 10.1   EGFR IF NONAFRICN AM mL/min/1.73 54*     Estimated Creatinine Clearance: 44.7 mL/min (by C-G formula based on SCr of 1 mg/dL).        Results from last 7 days  Lab Units 03/15/18  0846   WBC 10*3/mm3 9.99   RBC 10*6/mm3 5.46*   HEMOGLOBIN g/dL 16.6*   HEMATOCRIT % 49.2*   MCV fL 90.1   MCH pg 30.4   MCHC g/dL 33.7   RDW % 14.1*   RDW-SD fl 45.3   MPV fL 10.4   PLATELETS 10*3/mm3 177       Results from last 7 days  Lab Units 03/15/18  0901   PH, ARTERIAL pH units 7.423   PO2 ART mm Hg 595.7*   PCO2, ARTERIAL mm Hg 37.7   HCO3 ART mmol/L 24.6       Results from last 7 days  Lab Units 03/15/18  0846   TROPONIN T ng/mL <0.010                   Results from last 7 days  Lab Units 03/15/18  0846   INR  2.95*       Imaging Results (last 72 hours)     ** No results found for the last 72 hours. **                 Condition on Discharge:  Terminal    Vital Signs  Temp:  [98.4 °F (36.9 °C)-105 °F (40.6 °C)] 98.4 °F (36.9 °C)  Heart Rate:  [0-164] 0  Resp:  [0-26] 0  BP: (112)/(69) 112/69    Physical Exam:     and family at  bedside spoke with them patient is without spontaneous pulse or respirations    Discharge Disposition      Discharge Medications   Nancy Monteiro   Home Medication Instructions THOM:[id number]    Printed on:18 9276   Medication Information                      amLODIPine (NORVASC) 5 MG tablet  Take by mouth.             aspirin 81 MG tablet  Take 81 mg by mouth daily.             baclofen (LIORESAL) 10 MG tablet               CARTIA  MG 24 hr capsule               celecoxib (CeleBREX) 200 MG capsule               DENTA 5000 PLUS 1.1 % cream               diazepam (VALIUM) 2 MG tablet               diazepam (VALIUM) 5 MG tablet  Take by mouth. TAKE 1-2 TABS 30 MIN BEFORE MRI             diazepam (VALIUM) 5 MG tablet  Take 5 mg by mouth every 8 (eight) hours as needed for anxiety.             dicyclomine (BENTYL) 10 MG capsule               gabapentin (NEURONTIN) 300 MG capsule               HYDROcodone-acetaminophen (NORCO) 7.5-325 MG per tablet               hyoscyamine (LEVSIN) 0.125 MG SL tablet               lisinopril (PRINIVIL,ZESTRIL) 20 MG tablet               lisinopril (PRINIVIL,ZESTRIL) 40 MG tablet               metoprolol tartrate (LOPRESSOR) 100 MG tablet  Take by mouth.             metoprolol tartrate (LOPRESSOR) 50 MG tablet  Take 50 mg by mouth 2 (two) times a day.             naproxen (NAPROSYN) 500 MG tablet  Take 500 mg by mouth 2 (two) times a day with meals.             NITROSTAT 0.4 MG SL tablet               omeprazole (PriLOSEC) 40 MG capsule               ondansetron (ZOFRAN) 4 MG tablet               ondansetron ODT (ZOFRAN-ODT) 4 MG disintegrating tablet  Take 4 mg by mouth every 8 (eight) hours as needed for nausea or vomiting.             pantoprazole (PROTONIX) 40 MG EC tablet  Take 40 mg by mouth daily.             polyethylene glycol (MIRALAX) powder               traMADol (ULTRAM) 50 MG tablet  Take by mouth.             warfarin (COUMADIN) 3 MG tablet                    Discharge Diet:     Activity at Discharge:     Follow-up Appointments  No future appointments.      Test Results Pending at Discharge       Dequan Hernandes MD  03/20/18  9:21 AM    Time:

## 2020-11-04 NOTE — PROGRESS NOTES
Discharge Planning Assessment  [facility]     Patient Name: Nancy Monteiro  MRN: [id number]  Today's Date: 3/16/2018    Admit Date: 3/15/2018          Discharge Needs Assessment     Row Name 03/16/18 1503       Resource/Environmental Concerns    Transportation Concerns car, none       Transition Planning    Patient/Family Anticipates Transition to inpatient hospice       Discharge Needs Assessment    Equipment Currently Used at Home none            Discharge Plan     Row Name 03/16/18 1607       Plan    Plan Comments [facility] to evaluate for a [facility] scattered bed. [facility] will contact the family to set up an appointment. SIRIA Martin RN, CCP.     Row Name 03/16/18 2720       Plan    Plan Comments The patient was [place] [facility] [place]/15/18 @ 19:00. The patient is palliative. No [facility] evaluation at this time. CCP will follow for any needs that may arise. SIRIA Martin RN, CCP    Row Name 03/16/18 3957       Plan    Plan Comfort measures     Plan Comments Spoke with family at bedside, face sheet verified. Patient lives with her  Jules [phone]. Patient is comfort measures only. CCP left Bertha Martin's business card with the daughter at bedside for needs. Layne Hudson, FRANCO        Destination     No service coordination in this encounter.      Durable Medical Equipment     No service coordination in this encounter.      Dialysis/Infusion     No service coordination in this encounter.      Home Medical Care     No service coordination in this encounter.      Social Care     No service coordination in this encounter.                Demographic Summary     Row Name 03/16/18 1074       General Information    Admission Type inpatient    Arrived From home    Required Notices Provided Important Message from Medicare    Referral Source admission list    Reason for Consult discharge planning    Preferred Language English            Functional Status     Row Name 03/16/18 6264        Order signed   Functional Status    Current Activity Tolerance other (see comments)   comfort measures             Psychosocial    No documentation.           Abuse/Neglect    No documentation.           Legal    No documentation.           Substance Abuse    No documentation.           Patient Forms    No documentation.         Bertha Martin RN